# Patient Record
Sex: MALE | Race: BLACK OR AFRICAN AMERICAN | NOT HISPANIC OR LATINO | Employment: FULL TIME | URBAN - METROPOLITAN AREA
[De-identification: names, ages, dates, MRNs, and addresses within clinical notes are randomized per-mention and may not be internally consistent; named-entity substitution may affect disease eponyms.]

---

## 2024-05-17 ENCOUNTER — HOSPITAL ENCOUNTER (EMERGENCY)
Facility: HOSPITAL | Age: 59
Discharge: HOME | End: 2024-05-18
Attending: EMERGENCY MEDICINE
Payer: COMMERCIAL

## 2024-05-17 DIAGNOSIS — R79.89 ABNORMAL LFTS: ICD-10-CM

## 2024-05-17 DIAGNOSIS — L29.9 ITCHING: Primary | ICD-10-CM

## 2024-05-17 DIAGNOSIS — R73.9 HYPERGLYCEMIA: ICD-10-CM

## 2024-05-17 LAB — GLUCOSE BLDC GLUCOMTR-MCNC: 523 MG/DL (ref 70–130)

## 2024-05-17 PROCEDURE — 96374 THER/PROPH/DIAG INJ IV PUSH: CPT

## 2024-05-17 PROCEDURE — 25810000003 SODIUM CHLORIDE 0.9 % SOLUTION: Performed by: EMERGENCY MEDICINE

## 2024-05-17 PROCEDURE — 99285 EMERGENCY DEPT VISIT HI MDM: CPT

## 2024-05-17 PROCEDURE — 80053 COMPREHEN METABOLIC PANEL: CPT | Performed by: EMERGENCY MEDICINE

## 2024-05-17 PROCEDURE — 80143 DRUG ASSAY ACETAMINOPHEN: CPT | Performed by: EMERGENCY MEDICINE

## 2024-05-17 PROCEDURE — 85025 COMPLETE CBC W/AUTO DIFF WBC: CPT | Performed by: EMERGENCY MEDICINE

## 2024-05-17 PROCEDURE — 96375 TX/PRO/DX INJ NEW DRUG ADDON: CPT

## 2024-05-17 PROCEDURE — 82948 REAGENT STRIP/BLOOD GLUCOSE: CPT

## 2024-05-17 PROCEDURE — 25010000002 DIPHENHYDRAMINE PER 50 MG: Performed by: EMERGENCY MEDICINE

## 2024-05-17 RX ORDER — FAMOTIDINE 10 MG/ML
20 INJECTION, SOLUTION INTRAVENOUS ONCE
Status: COMPLETED | OUTPATIENT
Start: 2024-05-17 | End: 2024-05-17

## 2024-05-17 RX ORDER — DIPHENHYDRAMINE HYDROCHLORIDE 50 MG/ML
50 INJECTION INTRAMUSCULAR; INTRAVENOUS ONCE
Status: COMPLETED | OUTPATIENT
Start: 2024-05-17 | End: 2024-05-17

## 2024-05-17 RX ADMIN — SODIUM CHLORIDE 1000 ML: 9 INJECTION, SOLUTION INTRAVENOUS at 23:42

## 2024-05-17 RX ADMIN — DIPHENHYDRAMINE HYDROCHLORIDE 50 MG: 50 INJECTION INTRAMUSCULAR; INTRAVENOUS at 23:44

## 2024-05-17 RX ADMIN — FAMOTIDINE 20 MG: 10 INJECTION, SOLUTION INTRAVENOUS at 23:46

## 2024-05-18 ENCOUNTER — APPOINTMENT (OUTPATIENT)
Dept: CT IMAGING | Facility: HOSPITAL | Age: 59
End: 2024-05-18
Payer: COMMERCIAL

## 2024-05-18 VITALS
WEIGHT: 189 LBS | DIASTOLIC BLOOD PRESSURE: 87 MMHG | BODY MASS INDEX: 25.05 KG/M2 | HEIGHT: 73 IN | SYSTOLIC BLOOD PRESSURE: 166 MMHG | HEART RATE: 79 BPM | RESPIRATION RATE: 18 BRPM | TEMPERATURE: 98.6 F | OXYGEN SATURATION: 96 %

## 2024-05-18 LAB
ALBUMIN SERPL-MCNC: 3.6 G/DL (ref 3.5–5.2)
ALBUMIN/GLOB SERPL: 0.9 G/DL
ALP SERPL-CCNC: 508 U/L (ref 39–117)
ALT SERPL W P-5'-P-CCNC: 224 U/L (ref 1–41)
ANION GAP SERPL CALCULATED.3IONS-SCNC: 12 MMOL/L (ref 5–15)
APAP SERPL-MCNC: <5 MCG/ML (ref 0–30)
AST SERPL-CCNC: 138 U/L (ref 1–40)
BASOPHILS # BLD AUTO: 0.01 10*3/MM3 (ref 0–0.2)
BASOPHILS NFR BLD AUTO: 0.1 % (ref 0–1.5)
BILIRUB SERPL-MCNC: 2.2 MG/DL (ref 0–1.2)
BUN SERPL-MCNC: 24 MG/DL (ref 6–20)
BUN/CREAT SERPL: 15.1 (ref 7–25)
CALCIUM SPEC-SCNC: 9 MG/DL (ref 8.6–10.5)
CHLORIDE SERPL-SCNC: 94 MMOL/L (ref 98–107)
CO2 SERPL-SCNC: 22 MMOL/L (ref 22–29)
CREAT SERPL-MCNC: 1.59 MG/DL (ref 0.76–1.27)
DEPRECATED RDW RBC AUTO: 38.3 FL (ref 37–54)
EGFRCR SERPLBLD CKD-EPI 2021: 50 ML/MIN/1.73
EOSINOPHIL # BLD AUTO: 0.01 10*3/MM3 (ref 0–0.4)
EOSINOPHIL NFR BLD AUTO: 0.1 % (ref 0.3–6.2)
ERYTHROCYTE [DISTWIDTH] IN BLOOD BY AUTOMATED COUNT: 11.8 % (ref 12.3–15.4)
GLOBULIN UR ELPH-MCNC: 3.9 GM/DL
GLUCOSE BLDC GLUCOMTR-MCNC: 469 MG/DL (ref 70–130)
GLUCOSE SERPL-MCNC: 645 MG/DL (ref 65–99)
HAV IGM SERPL QL IA: NORMAL
HBV CORE IGM SERPL QL IA: NORMAL
HBV SURFACE AG SERPL QL IA: NORMAL
HCT VFR BLD AUTO: 39.1 % (ref 37.5–51)
HCV AB SER QL: NORMAL
HGB BLD-MCNC: 13.1 G/DL (ref 13–17.7)
IMM GRANULOCYTES # BLD AUTO: 0.04 10*3/MM3 (ref 0–0.05)
IMM GRANULOCYTES NFR BLD AUTO: 0.4 % (ref 0–0.5)
INR PPP: 0.98 (ref 0.89–1.12)
LYMPHOCYTES # BLD AUTO: 0.35 10*3/MM3 (ref 0.7–3.1)
LYMPHOCYTES NFR BLD AUTO: 3.9 % (ref 19.6–45.3)
MCH RBC QN AUTO: 29.9 PG (ref 26.6–33)
MCHC RBC AUTO-ENTMCNC: 33.5 G/DL (ref 31.5–35.7)
MCV RBC AUTO: 89.3 FL (ref 79–97)
MONOCYTES # BLD AUTO: 0.05 10*3/MM3 (ref 0.1–0.9)
MONOCYTES NFR BLD AUTO: 0.6 % (ref 5–12)
NEUTROPHILS NFR BLD AUTO: 8.57 10*3/MM3 (ref 1.7–7)
NEUTROPHILS NFR BLD AUTO: 94.9 % (ref 42.7–76)
NRBC BLD AUTO-RTO: 0 /100 WBC (ref 0–0.2)
PLATELET # BLD AUTO: 125 10*3/MM3 (ref 140–450)
PMV BLD AUTO: 14.1 FL (ref 6–12)
POTASSIUM SERPL-SCNC: 4.7 MMOL/L (ref 3.5–5.2)
PROT SERPL-MCNC: 7.5 G/DL (ref 6–8.5)
PROTHROMBIN TIME: 13.1 SECONDS (ref 12.2–14.5)
RBC # BLD AUTO: 4.38 10*6/MM3 (ref 4.14–5.8)
SODIUM SERPL-SCNC: 128 MMOL/L (ref 136–145)
WBC NRBC COR # BLD AUTO: 9.03 10*3/MM3 (ref 3.4–10.8)

## 2024-05-18 PROCEDURE — 63710000001 INSULIN REGULAR HUMAN PER 5 UNITS: Performed by: EMERGENCY MEDICINE

## 2024-05-18 PROCEDURE — 82948 REAGENT STRIP/BLOOD GLUCOSE: CPT

## 2024-05-18 PROCEDURE — 25810000003 SODIUM CHLORIDE 0.9 % SOLUTION: Performed by: EMERGENCY MEDICINE

## 2024-05-18 PROCEDURE — 25510000001 IOPAMIDOL 61 % SOLUTION: Performed by: EMERGENCY MEDICINE

## 2024-05-18 PROCEDURE — 85610 PROTHROMBIN TIME: CPT | Performed by: EMERGENCY MEDICINE

## 2024-05-18 PROCEDURE — 80074 ACUTE HEPATITIS PANEL: CPT | Performed by: EMERGENCY MEDICINE

## 2024-05-18 PROCEDURE — 74177 CT ABD & PELVIS W/CONTRAST: CPT

## 2024-05-18 RX ORDER — HYDROXYZINE PAMOATE 25 MG/1
25 CAPSULE ORAL 3 TIMES DAILY PRN
Qty: 20 CAPSULE | Refills: 0 | Status: SHIPPED | OUTPATIENT
Start: 2024-05-18

## 2024-05-18 RX ADMIN — INSULIN HUMAN 10 UNITS: 100 INJECTION, SOLUTION PARENTERAL at 00:20

## 2024-05-18 RX ADMIN — SODIUM CHLORIDE 1000 ML: 9 INJECTION, SOLUTION INTRAVENOUS at 01:15

## 2024-05-18 RX ADMIN — IOPAMIDOL 90 ML: 612 INJECTION, SOLUTION INTRAVENOUS at 00:34

## 2024-05-18 NOTE — ED PROVIDER NOTES
[facility]    EMERGENCY DEPARTMENT ENCOUNTER      Pt Name: Salvador Cortez  MRN: [id number]  YOB: 1965  Date of evaluation: 5/17/2024  Provider: Andi Cooney MD    CHIEF COMPLAINT       Chief Complaint   Patient presents with    Itching         HISTORY OF PRESENT ILLNESS   Salvador Cortez is a 58 y.o. male who presents to the emergency department with complaint of diffuse itching over the course of the past 2 days.  Patient has not noticed any skin rash.  Denies any new medications as well as any new recent exposures to soaps detergents,, or other identifiable allergens.  He has noticed no bugs in his home or on himself.  He has taken Benadryl at home with minimal relief.  Denies any other symptoms including any chest pain, fever, chills, abdominal pain, vomiting, diarrhea, or urinary symptoms.  He does have diabetes and notes that his blood glucose has been very high as well despite reported compliance with his medications.      Nursing notes were reviewed.    REVIEW OF SYSTEMS     ROS:  A chief complaint appropriate review of systems was completed and is negative except as noted in the HPI.      PAST MEDICAL HISTORY   No past medical history on file.      SURGICAL HISTORY     No past surgical history on file.      CURRENT MEDICATIONS     No current facility-administered medications for this encounter.    Current Outpatient Medications:     hydrOXYzine pamoate (Vistaril) 25 MG capsule, Take 1 capsule by mouth 3 (Three) Times a Day As Needed for Itching., Disp: 20 capsule, Rfl: 0    ALLERGIES     Patient has no known allergies.    FAMILY HISTORY     No family history on file.       SOCIAL HISTORY       Social History     Socioeconomic History    Marital status:          PHYSICAL EXAM    (up to 7 for level 4, 8 or more for level 5)     Vitals:    05/18/24 0112 05/18/24 0117 05/18/24 0122 05/18/24 0200   BP:       BP Location:       Patient Position:       Pulse: 90 89 93 79   Resp:    18    Temp:       TempSrc:       SpO2: 97% 98% 97% 96%   Weight:       Height:           General: Awake, alert, no acute distress.  HEENT: Conjunctivae normal.  Neck: Trachea midline.  Cardiac: Heart regular rate, rhythm, no murmurs, rubs, or gallops  Lungs: Lungs are clear to auscultation, there is no wheezing, rhonchi, or rales. There is no use of accessory muscles.  Chest wall: There is no tenderness to palpation over the chest wall or over ribs  Abdomen: Abdomen is soft, nontender, nondistended. There are no firm or pulsatile masses, no rebound rigidity or guarding.   Musculoskeletal: No deformity.  Neuro: Alert and oriented x 4.  Dermatology: Skin is warm and dry.  No skin rash noted.  Psych: Mentation is grossly normal, cognition is grossly normal. Affect is appropriate.        DIAGNOSTIC RESULTS     EKG: All EKGs are interpreted by the Emergency Department Physician who either signs or Co-signs this chart in the absence of a cardiologist.    No orders to display         RADIOLOGY:   [x] Radiologist's Report Reviewed:  CT Abdomen Pelvis With Contrast   Final Result   Impression:   No acute abdominal or pelvic abnormality            Electronically Signed: Chapin Parrish MD     5/18/2024 1:41 AM EDT     Workstation ID: [id number]          I ordered and independently reviewed the above noted radiographic studies.        LABS:    I have reviewed and interpreted all of the currently available lab results from this visit (if applicable):  Results for orders placed or performed during the hospital encounter of 05/17/24   Comprehensive Metabolic Panel    Specimen: Arm, Right; Blood   Result Value Ref Range    Glucose 645 (C) 65 - 99 mg/dL    BUN 24 (H) 6 - 20 mg/dL    Creatinine 1.59 (H) 0.76 - 1.27 mg/dL    Sodium 128 (L) 136 - 145 mmol/L    Potassium 4.7 3.5 - 5.2 mmol/L    Chloride 94 (L) 98 - 107 mmol/L    CO2 22.0 22.0 - 29.0 mmol/L    Calcium 9.0 8.6 - 10.5 mg/dL    Total Protein 7.5 6.0 - 8.5 g/dL    Albumin 3.6 3.5 -  5.2 g/dL    ALT (SGPT) 224 (H) 1 - 41 U/L    AST (SGOT) 138 (H) 1 - 40 U/L    Alkaline Phosphatase 508 (H) 39 - 117 U/L    Total Bilirubin 2.2 (H) 0.0 - 1.2 mg/dL    Globulin 3.9 gm/dL    A/G Ratio 0.9 g/dL    BUN/Creatinine Ratio 15.1 7.0 - 25.0    Anion Gap 12.0 5.0 - 15.0 mmol/L    eGFR 50.0 (L) >60.0 mL/min/1.73   CBC Auto Differential    Specimen: Arm, Right; Blood   Result Value Ref Range    WBC 9.03 3.40 - 10.80 10*3/mm3    RBC 4.38 4.14 - 5.80 10*6/mm3    Hemoglobin 13.1 13.0 - 17.7 g/dL    Hematocrit 39.1 37.5 - 51.0 %    MCV 89.3 79.0 - 97.0 fL    MCH 29.9 26.6 - 33.0 pg    MCHC 33.5 31.5 - 35.7 g/dL    RDW 11.8 (L) 12.3 - 15.4 %    RDW-SD 38.3 37.0 - 54.0 fl    MPV 14.1 (H) 6.0 - 12.0 fL    Platelets 125 (L) 140 - 450 10*3/mm3    Neutrophil % 94.9 (H) 42.7 - 76.0 %    Lymphocyte % 3.9 (L) 19.6 - 45.3 %    Monocyte % 0.6 (L) 5.0 - 12.0 %    Eosinophil % 0.1 (L) 0.3 - 6.2 %    Basophil % 0.1 0.0 - 1.5 %    Immature Grans % 0.4 0.0 - 0.5 %    Neutrophils, Absolute 8.57 (H) 1.70 - 7.00 10*3/mm3    Lymphocytes, Absolute 0.35 (L) 0.70 - 3.10 10*3/mm3    Monocytes, Absolute 0.05 (L) 0.10 - 0.90 10*3/mm3    Eosinophils, Absolute 0.01 0.00 - 0.40 10*3/mm3    Basophils, Absolute 0.01 0.00 - 0.20 10*3/mm3    Immature Grans, Absolute 0.04 0.00 - 0.05 10*3/mm3    nRBC 0.0 0.0 - 0.2 /100 WBC   Hepatitis Panel, Acute    Specimen: Arm, Right; Blood   Result Value Ref Range    Hepatitis B Surface Ag Non-Reactive Non-Reactive    Hep A IgM Non-Reactive Non-Reactive    Hep B C IgM Non-Reactive Non-Reactive    Hepatitis C Ab Non-Reactive Non-Reactive   Acetaminophen Level    Specimen: Arm, Right; Blood   Result Value Ref Range    Acetaminophen <5.0 0.0 - 30.0 mcg/mL   POC Glucose Once    Specimen: Blood   Result Value Ref Range    Glucose 523 (C) 70 - 130 mg/dL   POC Glucose Once    Specimen: Blood   Result Value Ref Range    Glucose 469 (C) 70 - 130 mg/dL        If labs were ordered, I independently reviewed the results and  considered them in treating the patient.      EMERGENCY DEPARTMENT COURSE and DIFFERENTIAL DIAGNOSIS/MDM:   Vitals:  AS OF 02:38 EDT    BP - 166/87  HR - 79  TEMP - 98.6 °F (37 °C) (Oral)  O2 SATS - 96%        Discussion below represents my analysis of pertinent findings related to patient's condition, differential diagnosis, treatment plan and final disposition.      Differential diagnosis:  The differential diagnosis associated with the patient's presentation includes: Allergic reaction, eczema, dry skin, liver disease, infestation      Independent interpretations (ECG/rhythm strip/X-ray/US/CT scan): I independently interpreted the patient's abdominal CT and cardiac monitor.  I see no evidence of biliary pathology and the patient is in sinus rhythm.      Additional sources:  Discussed/obtained information from independent historians:   [] Spouse:   [] Parent:   [] Friend:   [] EMS:   [x] Other: Additional history taken for the patient's significant other who accompanies the patient.  Reports the patient has been scratching constantly over the course of the last couple of days.  External (non-ED) record review:   [] Inpatient record:   [] Office record:   [] Outpatient record:   [] Prior Outpatient labs:   [] Prior Outpatient radiology:   [] Primary Care record:   [] Outside ED record:   [] Other:       Patient's care impacted by:   [x] Diabetes   [] Hypertension   [] Coronary Artery Disease   [] Cancer   [x] Other: Marijuana abuse    Care significantly affected by Social Determinants of Health (housing and economic circumstances, unemployment)    [] Yes     [x] No   If yes, Patient's care significantly limited by  Social Determinants of Health including:    [] Inadequate housing    [] Low income    [] Alcoholism and drug addiction in family    [] Problems related to primary support group    [] Unemployment    [] Problems related to employment    [] Other Social Determinants of Health:       Consideration of  admission/observation vs discharge: Consider admission, however after discussion with GI it was felt the patient could be followed up and worked up as an outpatient in the office on Monday.  Patient significantly hyperglycemic, however based on review of previous records it appears that patient's glucose is normally poorly controlled.  There is no evidence of associated DKA or HHS with his hyperglycemia today as well as no new renal injury or electrolyte derangement.  Patient was rehydrated and also given IV insulin with some improvement and felt that he was stable for discharge home with continued use of his outpatient medications.      ED Course:    ED Course as of 05/18/24 0238   Sat May 18, 2024   0159 Discussed case with GI Dr. Lewis.  I discussed patient history, presentation, workup.  Discussed labs in detail including abdominal imaging.  Patient does not need to be admitted at this time but he will see the patient in his office at 1030 on Monday morning. [NS]      ED Course User Index  [NS] Andi Cooney MD             I had a discussion with the patient/family regarding diagnosis, diagnostic results, treatment plan, and medications.  The patient/family indicated understanding of these instructions.  I spent adequate time at the bedside preceding discharge necessary to personally discuss the aftercare instructions, giving patient education, providing explanations of the results of our evaluations/findings, and my decision making to assure that the patient/family understand the plan of care.  Time was allotted to answer questions at that time and throughout the ED course.  Emphasis was placed on timely follow-up after discharge.  I also discussed the potential for the development of an acute emergent condition requiring further evaluation, admission, or even surgical intervention. I discussed that we found nothing during the visit today indicating the need for further workup, admission, or the presence  of an unstable medical condition.  I encouraged the patient to return to the emergency department immediately for ANY concerns, worsening, new complaints, or if symptoms persist and unable to seek follow-up in a timely fashion.  The patient/family expressed understanding and agreement with this plan.  The patient will follow-up with their PCP in 1-2 days for reevaluation.           PROCEDURES:  Procedures    CRITICAL CARE TIME        FINAL IMPRESSION      1. Itching    2. Abnormal LFTs    3. Hyperglycemia          DISPOSITION/PLAN     ED Disposition       ED Disposition   Discharge    Condition   Stable    Comment   --                 Comment: Please note this report has been produced using speech recognition software.      Andi Cooney MD  Attending Emergency Physician             Andi Cooney MD  05/18/24 4630

## 2024-05-18 NOTE — DISCHARGE INSTRUCTIONS
You have an appointment with gastroenterology at 10:30 AM on Monday morning.  Please attend this appointment as your liver enzymes are very abnormal and they need to do additional testing to make sure that something very serious is not going on.  Please return to the emergency department between now and then if your symptoms worsen in any way.

## 2024-05-20 ENCOUNTER — OFFICE VISIT (OUTPATIENT)
Dept: GASTROENTEROLOGY | Facility: CLINIC | Age: 59
End: 2024-05-20
Payer: COMMERCIAL

## 2024-05-20 ENCOUNTER — LAB (OUTPATIENT)
Dept: LAB | Facility: HOSPITAL | Age: 59
End: 2024-05-20
Payer: COMMERCIAL

## 2024-05-20 VITALS — BODY MASS INDEX: 25.84 KG/M2 | HEIGHT: 73 IN | WEIGHT: 195 LBS | TEMPERATURE: 97.7 F

## 2024-05-20 DIAGNOSIS — K74.60 CIRRHOSIS OF LIVER WITHOUT ASCITES, UNSPECIFIED HEPATIC CIRRHOSIS TYPE: ICD-10-CM

## 2024-05-20 DIAGNOSIS — R79.89 ELEVATED LIVER FUNCTION TESTS: ICD-10-CM

## 2024-05-20 DIAGNOSIS — Z00.00 HEALTHCARE MAINTENANCE: ICD-10-CM

## 2024-05-20 DIAGNOSIS — L29.8 CHOLESTATIC PRURITUS: Primary | ICD-10-CM

## 2024-05-20 DIAGNOSIS — E11.65 UNCONTROLLED DIABETES MELLITUS WITH HYPERGLYCEMIA, WITH LONG-TERM CURRENT USE OF INSULIN: ICD-10-CM

## 2024-05-20 DIAGNOSIS — Z79.4 UNCONTROLLED DIABETES MELLITUS WITH HYPERGLYCEMIA, WITH LONG-TERM CURRENT USE OF INSULIN: ICD-10-CM

## 2024-05-20 DIAGNOSIS — Z12.11 SCREEN FOR COLON CANCER: ICD-10-CM

## 2024-05-20 DIAGNOSIS — R79.89 ELEVATED SERUM CREATININE: ICD-10-CM

## 2024-05-20 PROBLEM — E11.21 DIABETIC NEPHROPATHY ASSOCIATED WITH TYPE 2 DIABETES MELLITUS: Chronic | Status: ACTIVE | Noted: 2023-11-13

## 2024-05-20 PROBLEM — K85.00 IDIOPATHIC ACUTE PANCREATITIS: Status: ACTIVE | Noted: 2023-11-13

## 2024-05-20 PROBLEM — I10 PRIMARY HYPERTENSION: Chronic | Status: ACTIVE | Noted: 2018-05-21

## 2024-05-20 PROBLEM — I87.2 VENOUS INSUFFICIENCY OF LEFT LOWER EXTREMITY: Status: ACTIVE | Noted: 2024-02-21

## 2024-05-20 PROBLEM — E11.9 DIABETES MELLITUS TREATED WITH INSULIN: Chronic | Status: ACTIVE | Noted: 2023-11-13

## 2024-05-20 PROBLEM — E11.9 DM (DIABETES MELLITUS): Chronic | Status: ACTIVE | Noted: 2018-05-21

## 2024-05-20 PROBLEM — Z96.642 STATUS POST TOTAL HIP REPLACEMENT, LEFT: Status: ACTIVE | Noted: 2021-12-13

## 2024-05-20 PROBLEM — E78.5 HYPERLIPIDEMIA: Chronic | Status: ACTIVE | Noted: 2018-05-21

## 2024-05-20 LAB
ALBUMIN SERPL-MCNC: 3.4 G/DL (ref 3.5–5.2)
ALP SERPL-CCNC: 597 U/L (ref 39–117)
ALPHA1 GLOB MFR UR ELPH: 189 MG/DL (ref 90–200)
ALT SERPL W P-5'-P-CCNC: 254 U/L (ref 1–41)
AST SERPL-CCNC: 177 U/L (ref 1–40)
BILIRUB CONJ SERPL-MCNC: 0.9 MG/DL (ref 0–0.3)
BILIRUB INDIRECT SERPL-MCNC: 0.4 MG/DL
BILIRUB SERPL-MCNC: 1.3 MG/DL (ref 0–1.2)
CERULOPLASMIN SERPL-MCNC: 33 MG/DL (ref 16–31)
CHOLEST SERPL-MCNC: 190 MG/DL (ref 0–200)
FERRITIN SERPL-MCNC: 323 NG/ML (ref 30–400)
HBV CORE IGM SERPL QL IA: NORMAL
HBV SURFACE AB SER RIA-ACNC: NORMAL
HBV SURFACE AG SERPL QL IA: NORMAL
HCV AB SER QL: NORMAL
HDLC SERPL-MCNC: 53 MG/DL (ref 40–60)
IRON 24H UR-MRATE: 36 MCG/DL (ref 59–158)
LDLC SERPL CALC-MCNC: 101 MG/DL (ref 0–100)
LDLC/HDLC SERPL: 1.79 {RATIO}
PROT SERPL-MCNC: 7.1 G/DL (ref 6–8.5)
TRIGL SERPL-MCNC: 211 MG/DL (ref 0–150)
VLDLC SERPL-MCNC: 36 MG/DL (ref 5–40)

## 2024-05-20 PROCEDURE — 86704 HEP B CORE ANTIBODY TOTAL: CPT

## 2024-05-20 PROCEDURE — 82390 ASSAY OF CERULOPLASMIN: CPT

## 2024-05-20 PROCEDURE — 80048 BASIC METABOLIC PNL TOTAL CA: CPT

## 2024-05-20 PROCEDURE — 82103 ALPHA-1-ANTITRYPSIN TOTAL: CPT

## 2024-05-20 PROCEDURE — 84443 ASSAY THYROID STIM HORMONE: CPT

## 2024-05-20 PROCEDURE — 86015 ACTIN ANTIBODY EACH: CPT

## 2024-05-20 PROCEDURE — 86706 HEP B SURFACE ANTIBODY: CPT

## 2024-05-20 PROCEDURE — 83540 ASSAY OF IRON: CPT

## 2024-05-20 PROCEDURE — 84439 ASSAY OF FREE THYROXINE: CPT

## 2024-05-20 PROCEDURE — 82784 ASSAY IGA/IGD/IGG/IGM EACH: CPT

## 2024-05-20 PROCEDURE — 36415 COLL VENOUS BLD VENIPUNCTURE: CPT

## 2024-05-20 PROCEDURE — 82728 ASSAY OF FERRITIN: CPT

## 2024-05-20 PROCEDURE — 80074 ACUTE HEPATITIS PANEL: CPT

## 2024-05-20 PROCEDURE — 80061 LIPID PANEL: CPT

## 2024-05-20 PROCEDURE — 80076 HEPATIC FUNCTION PANEL: CPT

## 2024-05-20 PROCEDURE — 86038 ANTINUCLEAR ANTIBODIES: CPT

## 2024-05-20 PROCEDURE — 86381 MITOCHONDRIAL ANTIBODY EACH: CPT

## 2024-05-20 RX ORDER — PEN NEEDLE, DIABETIC 32 GX 1/4"
NEEDLE, DISPOSABLE MISCELLANEOUS
COMMUNITY
Start: 2024-03-13

## 2024-05-20 RX ORDER — INSULIN GLARGINE 100 [IU]/ML
40 INJECTION, SOLUTION SUBCUTANEOUS DAILY
COMMUNITY
Start: 2024-03-13 | End: 2025-03-13

## 2024-05-20 RX ORDER — CHOLESTYRAMINE 4 G/9G
4 POWDER, FOR SUSPENSION ORAL 2 TIMES DAILY WITH MEALS
Qty: 378 G | Refills: 3 | Status: SHIPPED | OUTPATIENT
Start: 2024-05-20

## 2024-05-20 RX ORDER — IBUPROFEN 600 MG/1
TABLET ORAL
COMMUNITY
Start: 2024-05-08

## 2024-05-20 NOTE — PROGRESS NOTES
Office Note     Name: Salvador Cortez    : 1965     MRN: [id number]     Chief Complaint  Itching    Subjective     History of Present Illness:  Salvador Cortez is a 58 y.o. male with PMH significant for insulin-dependent DM, tobacco use, HTN, HLD, and acute pancreatitis in 2023, who presents today as hospital follow-up for diffuse itching, where he was also noted to have elevated liver enzymes, in a cholestatic pattern, including alk phos of 508 , and .  Previously normal in 2024. He also endorses excessive daytime sleepiness for the past 2 weeks, and appeared to be dozing off a couple of times during the exam.  He denies any new medications or recent exposure to new soaps, detergents, etc. He has multiple generalized skin lesions/vesicles, secondary to pruritus and scratching. Denies seeing any bugs in his home or on himself.  He was discharged from [facility] ED with hydroxyzine 3 times daily as needed, without symptom relief.  He does state that the drowsiness was present prior to initiating antihistamines. He has not had any fever, chills, abdominal pain, odynophagia, dysphagia vomiting, or diarrhea.  He endorses tobacco and marijuana use. His glucose has been extremely elevated, as high as 500-600, despite reported compliance with prescribed insulin regimen. No evidence of HHS or DKA on the ED.               Past Medical History:   Past Medical History:   Diagnosis Date    Tobacco use     Cessation encouraged       Past Surgical History: History reviewed. No pertinent surgical history.    Immunizations:   There is no immunization history on file for this patient.     Medications:     Current Outpatient Medications:     BD Pen Needle Micro U/F 32G X 6 MM misc, , Disp: , Rfl:      MG tablet, , Disp: , Rfl:     Lantus SoloStar 100 UNIT/ML injection pen, Inject 40 Units under the skin into the appropriate area as directed Daily., Disp: , Rfl:     cholestyramine  "(QUESTRAN) 4 GM/DOSE powder, Take 1 packet by mouth 2 (Two) Times a Day With Meals. mixed with a liquid, Disp: 378 g, Rfl: 3    hydrOXYzine pamoate (Vistaril) 25 MG capsule, Take 1 capsule by mouth 3 (Three) Times a Day As Needed for Itching., Disp: 20 capsule, Rfl: 0    Allergies:   No Known Allergies    Family History: History reviewed. No pertinent family history.    Social History:   Social History     Socioeconomic History    Marital status:    , working for the city. He is off work on Wednesdays and those are the best days for appointments.       Objective     Vital Signs  Temp 97.7 °F (36.5 °C)   Ht 185.4 cm (73\")   Wt 88.5 kg (195 lb)   BMI 25.73 kg/m²   Estimated body mass index is 25.73 kg/m² as calculated from the following:    Height as of this encounter: 185.4 cm (73\").    Weight as of this encounter: 88.5 kg (195 lb).    BMI is within normal parameters. No other follow-up for BMI required.      Physical Exam  Vitals reviewed.   Eyes:      General: No scleral icterus.  Pulmonary:      Effort: Pulmonary effort is normal.   Abdominal:      Palpations: There is no hepatomegaly or splenomegaly.      Tenderness: There is no abdominal tenderness. Negative signs include Ballesteros's sign.   Skin:     General: Skin is warm and dry.      Capillary Refill: Capillary refill takes less than 2 seconds.      Findings: Rash present. Rash is vesicular.   Neurological:      Mental Status: He is lethargic.   Psychiatric:         Behavior: Behavior is cooperative.          Assessment and Plan     Assessment & Plan  Healthcare maintenance    Hepatitis B vaccine when clinically improved, as he is not immune, per labs.   He believes that he had these vaccines when he was incarcerated, but is unsure when and does not have documentation.  Recommend patient get established with an endocrinologist to help him manage his diabetes.  His blood sugars have been poorly controlled, despite reported adherence to prescribed " regimen. Will place urgent referral.       Elevated liver function tests  (5/17/2024): , , alk phos 508, total bilirubin 2.2.  PT 13.1/INR 0.98.  Platelets 125, appears to be chronically low.  Suspect possible PBC, given history.   Previous hepatitis panel negative.  CT abdomen pelvis (5/18/2024): No acute abnormalities.  Liver normal in size without lesions.  CT Abdomen Pelvis Without Contrast (11/12/2023 16:33)   Cholestatic pruritus  Presumed cholestatic pruritus, given background and symptoms.  Antihistamines have not improved his symptoms.  Cholestyramine 1 packet by mouth twice daily with meals, mixed with liquid.  They are going to try this and let me know if it works for them.      Elevated serum creatinine  Recommend ongoing follow-up with PCP      Screen for colon cancer  No documentation of prior colorectal cancer screening  Recommend colonoscopy for colorectal cancer screening in the near future, once current condition improves.    Uncontrolled diabetes mellitus with hyperglycemia, with long-term current use of insulin    Referral to endocrinology     Cirrhosis of liver without ascites, unspecified hepatic cirrhosis type  Suspected cirrhosis, given low platelet count. Further recommendations to follow workup.         Orders Placed This Encounter   Procedures    Alpha - 1 - Antitrypsin    KENNEDI    Anti-Smooth Muscle Antibody Titer    Ceruloplasmin    Ferritin    Hepatitis A Antibody, Total    Hepatitis B Core Antibody, IgM    Mitochondrial Antibodies, M2    Iron    Hepatitis C Antibody    Hepatitis B Surface Antigen    Hepatitis B Surface Antibody    Hepatitis B Core Antibody, Total    Hepatic Function Panel    Lipid Panel    Basic Metabolic Panel    Ambulatory Referral For Screening Colonoscopy    Ambulatory Referral to Endocrinology     New Medications Ordered This Visit   Medications    cholestyramine (QUESTRAN) 4 GM/DOSE powder     Sig: Take 1 packet by mouth 2 (Two) Times a Day With  Meals. mixed with a liquid     Dispense:  378 g     Refill:  3          Follow Up  No follow-ups on file.    MIHIR Llamas  [facility] [id number]  [facility]  [address] [id number]

## 2024-05-20 NOTE — PATIENT INSTRUCTIONS
Get labs drawn for elevated liver enzymes.   Establish primary care with a provider. Will need referrals to endocrinology, nephrology, and possibly dermatology.   Cholestyramine powder, twice daily with meals for itching.   Schedule colonoscopy in the near future for colorectal cancer screening.     Josefina Yeung DO  [address] [place] [address] · [address]  [phone]

## 2024-05-21 ENCOUNTER — DOCUMENTATION (OUTPATIENT)
Dept: GASTROENTEROLOGY | Facility: CLINIC | Age: 59
End: 2024-05-21
Payer: COMMERCIAL

## 2024-05-21 LAB
ANA SER QL: NEGATIVE
ANION GAP SERPL CALCULATED.3IONS-SCNC: 14.2 MMOL/L (ref 5–15)
BUN SERPL-MCNC: 27 MG/DL (ref 6–20)
BUN/CREAT SERPL: 18.9 (ref 7–25)
CALCIUM SPEC-SCNC: 9.2 MG/DL (ref 8.6–10.5)
CHLORIDE SERPL-SCNC: 100 MMOL/L (ref 98–107)
CO2 SERPL-SCNC: 19.8 MMOL/L (ref 22–29)
CREAT SERPL-MCNC: 1.43 MG/DL (ref 0.76–1.27)
EGFRCR SERPLBLD CKD-EPI 2021: 56.8 ML/MIN/1.73
GLUCOSE SERPL-MCNC: 400 MG/DL (ref 65–99)
HAV AB SER QL IA: NEGATIVE
HBV CORE AB SERPL QL IA: NEGATIVE
IGA1 MFR SER: 217 MG/DL (ref 70–400)
IGG1 SER-MCNC: 1622 MG/DL (ref 700–1600)
IGM SERPL-MCNC: 118 MG/DL (ref 40–230)
MITOCHONDRIA M2 IGG SER-ACNC: 62.8 UNITS (ref 0–20)
POTASSIUM SERPL-SCNC: 4.5 MMOL/L (ref 3.5–5.2)
SMA IGG SER-ACNC: 17 UNITS (ref 0–19)
SODIUM SERPL-SCNC: 134 MMOL/L (ref 136–145)
T4 FREE SERPL-MCNC: 1 NG/DL (ref 0.93–1.7)
TSH SERPL DL<=0.05 MIU/L-ACNC: 0.96 UIU/ML (ref 0.27–4.2)

## 2024-05-21 RX ORDER — CLOBETASOL PROPIONATE 0.5 MG/G
1 OINTMENT TOPICAL NIGHTLY
Qty: 60 G | Refills: 0 | Status: SHIPPED | OUTPATIENT
Start: 2024-05-21

## 2024-05-21 NOTE — PROGRESS NOTES
Elevated triglycerides and LDL noted on labs. Given diabetes, would like to maintain LDL < 70. Will order atorvastatin 40 mg to reduce risk of ASCVD and pancreatitis. Also encourage weight loss of roughly 5% of current body weight, and 150 min/week of exercise, if possible. Reduce intake of simple carbohydrates, high-fructose foods/drinks. < 5% of total calories should come from added sugar. Total fat intake should be < 20-25% of total calories. My fitness pal can be helpful in tracking intake and calories.

## 2024-05-22 ENCOUNTER — TELEPHONE (OUTPATIENT)
Dept: INTERNAL MEDICINE | Facility: CLINIC | Age: 59
End: 2024-05-22

## 2024-05-22 DIAGNOSIS — R79.89 ELEVATED LIVER FUNCTION TESTS: Primary | ICD-10-CM

## 2024-05-22 DIAGNOSIS — K74.3 HEPATIC CIRRHOSIS DUE TO PRIMARY BILIARY CHOLANGITIS: Primary | ICD-10-CM

## 2024-05-22 RX ORDER — URSODIOL 300 MG/1
300 CAPSULE ORAL 3 TIMES DAILY
Qty: 180 CAPSULE | Refills: 3 | Status: SHIPPED | OUTPATIENT
Start: 2024-05-22

## 2024-05-22 NOTE — TELEPHONE ENCOUNTER
Caller: MARIA VICTORIA MOLINA    Relationship: Emergency Contact    Best call back number: [phone]       What test was performed: LIVER TEST    When was the test performed: 5/18/24      Additional notes: PATIENTS SPOUSE STATES HE IS STILL ITCHING AND THE MEDICATION IS NOT WORKING

## 2024-05-23 ENCOUNTER — TELEPHONE (OUTPATIENT)
Dept: GASTROENTEROLOGY | Facility: CLINIC | Age: 59
End: 2024-05-23
Payer: COMMERCIAL

## 2024-05-23 DIAGNOSIS — R79.89 ELEVATED LIVER FUNCTION TESTS: Primary | ICD-10-CM

## 2024-05-28 ENCOUNTER — TELEPHONE (OUTPATIENT)
Dept: GASTROENTEROLOGY | Facility: CLINIC | Age: 59
End: 2024-05-28

## 2024-05-28 NOTE — TELEPHONE ENCOUNTER
Hub staff attempted to follow warm transfer process and was unsuccessful     Caller: MARIA VICTORIA MOLINA    Relationship to patient: Emergency Contact    Best call back number:  [phone] OR [phone]    Patient is needing: CALLING REGARDING LAB RESULTS

## 2024-05-30 ENCOUNTER — OFFICE VISIT (OUTPATIENT)
Dept: ENDOCRINOLOGY | Facility: CLINIC | Age: 59
End: 2024-05-30
Payer: COMMERCIAL

## 2024-05-30 ENCOUNTER — DOCUMENTATION (OUTPATIENT)
Dept: ENDOCRINOLOGY | Facility: CLINIC | Age: 59
End: 2024-05-30

## 2024-05-30 VITALS
HEART RATE: 82 BPM | DIASTOLIC BLOOD PRESSURE: 72 MMHG | SYSTOLIC BLOOD PRESSURE: 122 MMHG | OXYGEN SATURATION: 98 % | HEIGHT: 73 IN | WEIGHT: 190 LBS | BODY MASS INDEX: 25.18 KG/M2

## 2024-05-30 DIAGNOSIS — I10 PRIMARY HYPERTENSION: Chronic | ICD-10-CM

## 2024-05-30 DIAGNOSIS — E78.2 MIXED HYPERLIPIDEMIA: Chronic | ICD-10-CM

## 2024-05-30 DIAGNOSIS — Z79.4 DIABETES MELLITUS TREATED WITH INSULIN: Primary | Chronic | ICD-10-CM

## 2024-05-30 DIAGNOSIS — E11.9 DIABETES MELLITUS TREATED WITH INSULIN: Primary | Chronic | ICD-10-CM

## 2024-05-30 LAB
EXPIRATION DATE: ABNORMAL
EXPIRATION DATE: ABNORMAL
GLUCOSE BLDC GLUCOMTR-MCNC: 194 MG/DL (ref 70–130)
HBA1C MFR BLD: 9.7 % (ref 4.5–5.7)
Lab: ABNORMAL
Lab: ABNORMAL

## 2024-05-30 RX ORDER — BLOOD-GLUCOSE SENSOR
1 EACH MISCELLANEOUS
Qty: 6 EACH | Refills: 3 | Status: SHIPPED | OUTPATIENT
Start: 2024-05-30

## 2024-05-30 NOTE — ASSESSMENT & PLAN NOTE
Recent LDL and trigs just above goal.  We discussed treatment options.  I don't want to start meds due to elevated LFTs.  Hopefully these should improve with better DM control.

## 2024-05-30 NOTE — PROGRESS NOTES
Pt seen for DM education, reviewed BG goals, basic nutrition, and reviewed CGM usage. Pt set up with [facility] - pt is sharing. Encouraged pt and family member to call with questions/concerns.

## 2024-05-30 NOTE — PROGRESS NOTES
"     Office Note      Date: 2024  Patient Name: Salvador Cortez  MRN: [id number]  : 1965    Chief Complaint   Patient presents with    Diabetes     Non-controlled DM with glucose levels 500-600. On insulin.       History of Present Illness:   Salvador Cortez is a 58 y.o. male who presents for Diabetes type 2. Diagnosed in: 2019. Treated in past with insulin. Subsequently changed to metformin.  Current treatments: basal bolus insulin. Number of insulin shots per day: 3. Checks blood sugar none times a day. Has low blood sugar: no. Aspirin use: No -   . Statin use: No - on cholestyramine . ACE-I/ARB use: No -   .  Last eye exam: 2024.    He hasn't had any formal DM education.    He had episode of pancreatitis in 2023.    He has had issues with itching.  He has had some peeling of the skin on his feet and hands.      Subjective      Diabetic Complications:  Eyes: No  Kidneys: No  Feet: No  Heart: No    Diet and Exercise:  Meals per day: 3  Minutes of exercise per week: 0 mins.    Review of Systems:   Review of Systems   Constitutional: Negative.    HENT: Negative.     Eyes: Negative.    Respiratory: Negative.     Cardiovascular:  Positive for leg swelling.   Gastrointestinal: Negative.    Endocrine: Negative.    Genitourinary: Negative.    Musculoskeletal:  Positive for gait problem and myalgias.   Skin:  Positive for color change and rash.   Allergic/Immunologic: Negative.    Hematological: Negative.    Psychiatric/Behavioral: Negative.         The following portions of the patient's history were reviewed and updated as appropriate: allergies, current medications, past family history, past medical history, past social history, past surgical history, and problem list.    Objective     Visit Vitals  /72 (BP Location: Right arm, Patient Position: Sitting, Cuff Size: Adult)   Pulse 82   Ht 185.4 cm (73\")   Wt 86.2 kg (190 lb)   SpO2 98%   BMI 25.07 kg/m²       Physical Exam:  Physical " Exam  Constitutional:       Appearance: Normal appearance.   HENT:      Head: Normocephalic and atraumatic.   Eyes:      Extraocular Movements: Extraocular movements intact.      Conjunctiva/sclera: Conjunctivae normal.   Neck:      Thyroid: No thyroid mass, thyromegaly or thyroid tenderness.   Cardiovascular:      Rate and Rhythm: Normal rate and regular rhythm.      Pulses: Normal pulses.      Heart sounds: Normal heart sounds.   Pulmonary:      Effort: Pulmonary effort is normal.      Breath sounds: Normal breath sounds.   Abdominal:      General: Bowel sounds are normal.      Palpations: Abdomen is soft.   Musculoskeletal:         General: Normal range of motion.      Cervical back: Normal range of motion and neck supple.   Feet:      Right foot:      Skin integrity: Dry skin present.      Left foot:      Skin integrity: Dry skin present.   Lymphadenopathy:      Cervical: No cervical adenopathy.   Skin:     General: Skin is warm and dry.   Neurological:      General: No focal deficit present.      Mental Status: He is alert.   Psychiatric:         Mood and Affect: Mood normal.         Behavior: Behavior normal.         Thought Content: Thought content normal.         Judgment: Judgment normal.         Labs:    HbA1c  Hemoglobin A1C   Date Value Ref Range Status   05/30/2024 9.7 (A) 4.5 - 5.7 % Final   .    CMP  Lab Results   Component Value Date    GLUCOSE 400 (H) 05/20/2024    BUN 27 (H) 05/20/2024    CREATININE 1.43 (H) 05/20/2024    BCR 18.9 05/20/2024    K 4.5 05/20/2024    CO2 19.8 (L) 05/20/2024    CALCIUM 9.2 05/20/2024     (H) 05/20/2024     (H) 05/20/2024        Lipid Panel  Lab Results   Component Value Date    HDL 53 05/20/2024     (H) 05/20/2024    TRIG 211 (H) 05/20/2024        TSH  Lab Results   Component Value Date    TSH 0.956 05/20/2024    FREET4 1.00 05/20/2024        Hemoglobin A1C  Lab Results   Component Value Date    HGBA1C 9.7 (A) 05/30/2024     "    Microalbumin/Creatinine  No results found for: \"MALBCRERATI\"        Assessment / Plan      Assessment & Plan:  Diagnoses and all orders for this visit:    1. Diabetes mellitus treated with insulin (Primary)  Assessment & Plan:  Diabetes is  uncontrolled .  A1c above goal.    We discussed treatment options.  I don't want to use GLP-1 RA due to recent pancreatitis.  Trial of SGLT-2 inhibitor.  Potential s/e discussed.  Start CGM also.    Diabetes will be reassessed in 3 months.    Orders:  -     POC Glucose, Blood  -     POC Glycosylated Hemoglobin (Hb A1C)    2. Primary hypertension  Assessment & Plan:  BP improving with treatment.  Continue current meds.  Monitor BP at home.      3. Mixed hyperlipidemia  Assessment & Plan:  Recent LDL and trigs just above goal.  We discussed treatment options.  I don't want to start meds due to elevated LFTs.  Hopefully these should improve with better DM control.      Other orders  -     Continuous Glucose Sensor (FreeStyle Wilbert 3 Sensor) misc; Use 1 each Every 14 (Fourteen) Days.  Dispense: 6 each; Refill: 3  -     empagliflozin (JARDIANCE) 25 MG tablet tablet; Take 1 tablet by mouth Daily.  Dispense: 90 tablet; Refill: 3       Current Outpatient Medications   Medication Instructions    BD Pen Needle Micro U/F 32G X 6 MM misc     cholestyramine (QUESTRAN) 4 g, Oral, 2 Times Daily With Meals, mixed with a liquid    clobetasol (TEMOVATE) 0.05 % ointment 1 Application, Topical, Nightly    Continuous Glucose Sensor (FreeStyle Wilbert 3 Sensor) misc 1 each, Does not apply, Every 14 Days    empagliflozin (JARDIANCE) 25 mg, Oral, Daily    hydrOXYzine pamoate (VISTARIL) 25 mg, Oral, 3 Times Daily PRN     MG tablet     insulin aspart (NOVOLOG FLEXPEN) 20 Units, Subcutaneous, 3 Times Daily With Meals    Lantus SoloStar 40 Units, Subcutaneous, Daily    ursodiol (ACTIGALL) 300 mg, Oral, 3 Times Daily      Return in about 3 months (around 8/30/2024) for Recheck with A1c, CMP, lipid, " TSH, microalbumin - ok to schedule with PA.    Electronically signed by: Gianni Cruz MD  05/30/2024

## 2024-05-30 NOTE — ASSESSMENT & PLAN NOTE
Diabetes is  uncontrolled .  A1c above goal.    We discussed treatment options.  I don't want to use GLP-1 RA due to recent pancreatitis.  Trial of SGLT-2 inhibitor.  Potential s/e discussed.  Start CGM also.    Diabetes will be reassessed in 3 months.

## 2024-05-31 ENCOUNTER — TELEPHONE (OUTPATIENT)
Dept: ENDOCRINOLOGY | Facility: CLINIC | Age: 59
End: 2024-05-31

## 2024-05-31 ENCOUNTER — HOSPITAL ENCOUNTER (EMERGENCY)
Facility: HOSPITAL | Age: 59
Discharge: HOME | End: 2024-05-31
Attending: EMERGENCY MEDICINE
Payer: COMMERCIAL

## 2024-05-31 ENCOUNTER — PRIOR AUTHORIZATION (OUTPATIENT)
Dept: ENDOCRINOLOGY | Facility: CLINIC | Age: 59
End: 2024-05-31
Payer: COMMERCIAL

## 2024-05-31 ENCOUNTER — APPOINTMENT (OUTPATIENT)
Dept: GENERAL RADIOLOGY | Facility: HOSPITAL | Age: 59
End: 2024-05-31
Payer: COMMERCIAL

## 2024-05-31 ENCOUNTER — APPOINTMENT (OUTPATIENT)
Dept: CARDIOLOGY | Facility: HOSPITAL | Age: 59
End: 2024-05-31
Payer: COMMERCIAL

## 2024-05-31 ENCOUNTER — TELEPHONE (OUTPATIENT)
Dept: GASTROENTEROLOGY | Facility: CLINIC | Age: 59
End: 2024-05-31
Payer: COMMERCIAL

## 2024-05-31 VITALS
SYSTOLIC BLOOD PRESSURE: 117 MMHG | OXYGEN SATURATION: 100 % | DIASTOLIC BLOOD PRESSURE: 65 MMHG | HEIGHT: 73 IN | RESPIRATION RATE: 18 BRPM | WEIGHT: 193 LBS | HEART RATE: 82 BPM | TEMPERATURE: 98.9 F | BODY MASS INDEX: 25.58 KG/M2

## 2024-05-31 DIAGNOSIS — R21 RASH: ICD-10-CM

## 2024-05-31 DIAGNOSIS — R60.0 PEDAL EDEMA: Primary | ICD-10-CM

## 2024-05-31 DIAGNOSIS — R79.89 ELEVATED LIVER FUNCTION TESTS: ICD-10-CM

## 2024-05-31 DIAGNOSIS — I07.1 TRICUSPID VALVE INSUFFICIENCY, UNSPECIFIED ETIOLOGY: ICD-10-CM

## 2024-05-31 LAB
ALBUMIN SERPL-MCNC: 3.4 G/DL (ref 3.5–5.2)
ALBUMIN/GLOB SERPL: 0.9 G/DL
ALP SERPL-CCNC: 283 U/L (ref 39–117)
ALT SERPL W P-5'-P-CCNC: 32 U/L (ref 1–41)
ANION GAP SERPL CALCULATED.3IONS-SCNC: 6 MMOL/L (ref 5–15)
ANION GAP SERPL CALCULATED.3IONS-SCNC: 7 MMOL/L (ref 5–15)
AST SERPL-CCNC: 17 U/L (ref 1–40)
BASOPHILS # BLD AUTO: 0.01 10*3/MM3 (ref 0–0.2)
BASOPHILS NFR BLD AUTO: 0.2 % (ref 0–1.5)
BILIRUB SERPL-MCNC: 0.8 MG/DL (ref 0–1.2)
BUN SERPL-MCNC: 13 MG/DL (ref 6–20)
BUN SERPL-MCNC: 13 MG/DL (ref 6–20)
BUN/CREAT SERPL: 10.5 (ref 7–25)
BUN/CREAT SERPL: 9.8 (ref 7–25)
CALCIUM SPEC-SCNC: 8.9 MG/DL (ref 8.6–10.5)
CALCIUM SPEC-SCNC: 8.9 MG/DL (ref 8.6–10.5)
CHLORIDE SERPL-SCNC: 103 MMOL/L (ref 98–107)
CHLORIDE SERPL-SCNC: 103 MMOL/L (ref 98–107)
CO2 SERPL-SCNC: 28 MMOL/L (ref 22–29)
CO2 SERPL-SCNC: 29 MMOL/L (ref 22–29)
CREAT SERPL-MCNC: 1.24 MG/DL (ref 0.76–1.27)
CREAT SERPL-MCNC: 1.33 MG/DL (ref 0.76–1.27)
CRP SERPL-MCNC: 3.31 MG/DL (ref 0–0.5)
D-LACTATE SERPL-SCNC: 0.8 MMOL/L (ref 0.5–2)
DEPRECATED RDW RBC AUTO: 39.8 FL (ref 37–54)
EGFRCR SERPLBLD CKD-EPI 2021: 62 ML/MIN/1.73
EGFRCR SERPLBLD CKD-EPI 2021: 67.4 ML/MIN/1.73
EOSINOPHIL # BLD AUTO: 0.12 10*3/MM3 (ref 0–0.4)
EOSINOPHIL NFR BLD AUTO: 2.6 % (ref 0.3–6.2)
ERYTHROCYTE [DISTWIDTH] IN BLOOD BY AUTOMATED COUNT: 11.9 % (ref 12.3–15.4)
ERYTHROCYTE [SEDIMENTATION RATE] IN BLOOD: 86 MM/HR (ref 0–20)
GLOBULIN UR ELPH-MCNC: 3.8 GM/DL
GLUCOSE SERPL-MCNC: 231 MG/DL (ref 65–99)
GLUCOSE SERPL-MCNC: 233 MG/DL (ref 65–99)
HCT VFR BLD AUTO: 32.8 % (ref 37.5–51)
HGB BLD-MCNC: 10.7 G/DL (ref 13–17.7)
IMM GRANULOCYTES # BLD AUTO: 0.01 10*3/MM3 (ref 0–0.05)
IMM GRANULOCYTES NFR BLD AUTO: 0.2 % (ref 0–0.5)
INR PPP: 1.03 (ref 0.89–1.12)
LITHIUM SERPL-SCNC: <0.1 MMOL/L (ref 0.6–1.2)
LYMPHOCYTES # BLD AUTO: 0.9 10*3/MM3 (ref 0.7–3.1)
LYMPHOCYTES NFR BLD AUTO: 19.5 % (ref 19.6–45.3)
MAGNESIUM SERPL-MCNC: 1.6 MG/DL (ref 1.6–2.6)
MCH RBC QN AUTO: 29.7 PG (ref 26.6–33)
MCHC RBC AUTO-ENTMCNC: 32.6 G/DL (ref 31.5–35.7)
MCV RBC AUTO: 91.1 FL (ref 79–97)
MONOCYTES # BLD AUTO: 0.3 10*3/MM3 (ref 0.1–0.9)
MONOCYTES NFR BLD AUTO: 6.5 % (ref 5–12)
NEUTROPHILS NFR BLD AUTO: 3.27 10*3/MM3 (ref 1.7–7)
NEUTROPHILS NFR BLD AUTO: 71 % (ref 42.7–76)
NRBC BLD AUTO-RTO: 0 /100 WBC (ref 0–0.2)
NT-PROBNP SERPL-MCNC: 294.5 PG/ML (ref 0–900)
PLATELET # BLD AUTO: 132 10*3/MM3 (ref 140–450)
PMV BLD AUTO: 13.4 FL (ref 6–12)
POTASSIUM SERPL-SCNC: 3.8 MMOL/L (ref 3.5–5.2)
POTASSIUM SERPL-SCNC: 3.9 MMOL/L (ref 3.5–5.2)
PROT SERPL-MCNC: 7.2 G/DL (ref 6–8.5)
PROTHROMBIN TIME: 13.6 SECONDS (ref 12.2–14.5)
QT INTERVAL: 388 MS
QTC INTERVAL: 393 MS
RBC # BLD AUTO: 3.6 10*6/MM3 (ref 4.14–5.8)
SODIUM SERPL-SCNC: 138 MMOL/L (ref 136–145)
SODIUM SERPL-SCNC: 138 MMOL/L (ref 136–145)
WBC NRBC COR # BLD AUTO: 4.61 10*3/MM3 (ref 3.4–10.8)

## 2024-05-31 PROCEDURE — 99284 EMERGENCY DEPT VISIT MOD MDM: CPT

## 2024-05-31 PROCEDURE — 86592 SYPHILIS TEST NON-TREP QUAL: CPT | Performed by: EMERGENCY MEDICINE

## 2024-05-31 PROCEDURE — 83605 ASSAY OF LACTIC ACID: CPT | Performed by: EMERGENCY MEDICINE

## 2024-05-31 PROCEDURE — 86140 C-REACTIVE PROTEIN: CPT | Performed by: EMERGENCY MEDICINE

## 2024-05-31 PROCEDURE — 93306 TTE W/DOPPLER COMPLETE: CPT

## 2024-05-31 PROCEDURE — 93306 TTE W/DOPPLER COMPLETE: CPT | Performed by: INTERNAL MEDICINE

## 2024-05-31 PROCEDURE — 87040 BLOOD CULTURE FOR BACTERIA: CPT | Performed by: EMERGENCY MEDICINE

## 2024-05-31 PROCEDURE — 84466 ASSAY OF TRANSFERRIN: CPT

## 2024-05-31 PROCEDURE — 80053 COMPREHEN METABOLIC PANEL: CPT | Performed by: EMERGENCY MEDICINE

## 2024-05-31 PROCEDURE — 85025 COMPLETE CBC W/AUTO DIFF WBC: CPT | Performed by: EMERGENCY MEDICINE

## 2024-05-31 PROCEDURE — 86376 MICROSOMAL ANTIBODY EACH: CPT | Performed by: INTERNAL MEDICINE

## 2024-05-31 PROCEDURE — 80178 ASSAY OF LITHIUM: CPT

## 2024-05-31 PROCEDURE — 36415 COLL VENOUS BLD VENIPUNCTURE: CPT

## 2024-05-31 PROCEDURE — 85610 PROTHROMBIN TIME: CPT

## 2024-05-31 PROCEDURE — 83880 ASSAY OF NATRIURETIC PEPTIDE: CPT | Performed by: EMERGENCY MEDICINE

## 2024-05-31 PROCEDURE — 86593 SYPHILIS TEST NON-TREP QUANT: CPT | Performed by: EMERGENCY MEDICINE

## 2024-05-31 PROCEDURE — 86780 TREPONEMA PALLIDUM: CPT | Performed by: EMERGENCY MEDICINE

## 2024-05-31 PROCEDURE — 83516 IMMUNOASSAY NONANTIBODY: CPT | Performed by: INTERNAL MEDICINE

## 2024-05-31 PROCEDURE — 83540 ASSAY OF IRON: CPT

## 2024-05-31 PROCEDURE — 93005 ELECTROCARDIOGRAM TRACING: CPT | Performed by: EMERGENCY MEDICINE

## 2024-05-31 PROCEDURE — 85652 RBC SED RATE AUTOMATED: CPT | Performed by: EMERGENCY MEDICINE

## 2024-05-31 PROCEDURE — 82787 IGG 1 2 3 OR 4 EACH: CPT

## 2024-05-31 PROCEDURE — 71045 X-RAY EXAM CHEST 1 VIEW: CPT

## 2024-05-31 PROCEDURE — 83735 ASSAY OF MAGNESIUM: CPT | Performed by: EMERGENCY MEDICINE

## 2024-05-31 RX ORDER — POTASSIUM CHLORIDE 20 MEQ/1
20 TABLET, EXTENDED RELEASE ORAL DAILY
Qty: 5 TABLET | Refills: 0 | Status: SHIPPED | OUTPATIENT
Start: 2024-05-31

## 2024-05-31 RX ORDER — FUROSEMIDE 40 MG/1
40 TABLET ORAL DAILY
Qty: 5 TABLET | Refills: 0 | Status: SHIPPED | OUTPATIENT
Start: 2024-05-31

## 2024-05-31 RX ORDER — SODIUM CHLORIDE 0.9 % (FLUSH) 0.9 %
10 SYRINGE (ML) INJECTION AS NEEDED
Status: DISCONTINUED | OUTPATIENT
Start: 2024-05-31 | End: 2024-05-31 | Stop reason: HOSPADM

## 2024-05-31 NOTE — TELEPHONE ENCOUNTER
PT HAS CALLED AND IS IN BAD PAIN. INSURANCE DID NOT COVER ALL THE MEDS THAT WAS CALLED IN YESTERDAY. DO WE HAVE ANY SAMPLES OR ANYTHING TO HELP

## 2024-05-31 NOTE — ED PROVIDER NOTES
Subjective   History of Present Illness    Pt presents with leg swelling.  Onset a few weeks ago, getting worse.  Some swelling in his hands last few days as well.  He has a rash on his palms with a few lesions on the trunk and one on the left ear.  He denies fever, cough, dyspnea, chest pain.  He has had some itching all over.  He was seen here a few weeks ago and was told his liver was abnormal and referred to GI.  He saw them and they ordered some additional testing, he was supposed to get it done but hasn't been to the outpatient lab.    He has a history of DM that is not well controlled.  He says he was started on a new medication for that and had first dose today, was told it would help the leg swelling as well.  He denies other known PMH.      History provided by:  Patient      Review of Systems   Constitutional:  Negative for fever.   Respiratory:  Negative for cough and shortness of breath.    Cardiovascular:  Positive for leg swelling.   Skin:  Positive for rash.   All other systems reviewed and are negative.      Past Medical History:   Diagnosis Date    Tobacco use     Cessation encouraged    Type 2 diabetes mellitus        No Known Allergies    History reviewed. No pertinent surgical history.    Family History   Problem Relation Age of Onset    Diabetes Father     Diabetes Child        Social History     Socioeconomic History    Marital status:    Tobacco Use    Smoking status: Some Days     Types: Cigarettes     Passive exposure: Current    Smokeless tobacco: Never   Vaping Use    Vaping status: Never Used   Substance and Sexual Activity    Drug use: Yes     Types: Marijuana    Sexual activity: Yes     Partners: Female           Objective   Physical Exam  Vitals and nursing note reviewed.   Constitutional:       General: He is not in acute distress.     Appearance: Normal appearance. He is not ill-appearing.   HENT:      Head: Normocephalic and atraumatic.      Mouth/Throat:      Mouth: Mucous  membranes are moist.   Eyes:      General: No scleral icterus.        Right eye: No discharge.         Left eye: No discharge.      Conjunctiva/sclera: Conjunctivae normal.   Cardiovascular:      Rate and Rhythm: Normal rate and regular rhythm.      Heart sounds: Murmur (2/6 systolic) heard.   Pulmonary:      Effort: Pulmonary effort is normal. No respiratory distress.      Breath sounds: Normal breath sounds. No wheezing.   Abdominal:      General: Bowel sounds are normal. There is no distension.      Palpations: Abdomen is soft.      Tenderness: There is no abdominal tenderness. There is no guarding or rebound.   Musculoskeletal:         General: No swelling. Normal range of motion.      Cervical back: Normal range of motion and neck supple.   Skin:     General: Skin is warm and dry.      Findings: No rash.   Neurological:      General: No focal deficit present.      Mental Status: He is alert and oriented to person, place, and time. Mental status is at baseline.   Psychiatric:         Mood and Affect: Mood normal.         Behavior: Behavior normal.         Thought Content: Thought content normal.         Procedures           ED Course    I reviewed prior records.  Endocrinology note from 5/30/24 notes severe hyperglycemia with elevated A1c and meds were changed.  I reviewed GI note from 5/20/24 for elevated LFTs and pruritus, he was given Questran and further workup was ordered.  There is a PCP visit note from 2/21/24 for left leg swelling that notes he had improvement with Lasix.      He has a heart murmur on exam, says he has never been told that.  His hand lesions don't really look like janeway lesions and he has the rash on other parts of him where those lesions aren't expected to appear but with murmur and a rash and abnormal LFTs recently I feel endocarditis must be considered.    We have asked the lab to run his GI-ordered labs and we sent up separately labeled tubes for that purpose but they are showing  up on the ED workup anyway.    CXR read by me, no acute process no active CHF.  Radiology report reviewed.    EKG read by me, NSR.    Echocardiogram ordered.  He has TR with no report of vegetations seen.  EF normal.    Labs are benign here, nonspecific CRP and ESR elevations.  There are some studies that will not result tonight.  Discussed with patient and family that they will need to keep outpatient followup as scheduled to discuss those results.    Patient stable on serial rechecks.  Discussed findings, concerns, plan of care, expected course, reasons to return and followup.  Provided the opportunity to ask questions.    Recommend diuretics plus compression stockings short term for symptomatic pedal edema.  Needs continued workup for underlying pathology and he is encouraged to keep all follow up appts.                                         Medical Decision Making  Problems Addressed:  Pedal edema: complicated acute illness or injury  Rash: complicated acute illness or injury  Tricuspid valve insufficiency, unspecified etiology: undiagnosed new problem with uncertain prognosis    Amount and/or Complexity of Data Reviewed  Independent Historian: spouse  External Data Reviewed: notes.  Labs: ordered. Decision-making details documented in ED Course.  Radiology: ordered and independent interpretation performed. Decision-making details documented in ED Course.  ECG/medicine tests: ordered and independent interpretation performed. Decision-making details documented in ED Course.    Risk  Prescription drug management.        Final diagnoses:   Pedal edema   Rash   Tricuspid valve insufficiency, unspecified etiology       ED Disposition  ED Disposition       ED Disposition   Discharge    Condition   Stable    Comment   --               Warren Lewis MD  [address] [address]  [phone]    On 6/6/2024  As scheduled    Your doctor    In 1 week           Medication List        New  Prescriptions      furosemide 40 MG tablet  Commonly known as: LASIX  Take 1 tablet by mouth Daily.     potassium chloride 20 MEQ CR tablet  Commonly known as: KLOR-CON M20  Take 1 tablet by mouth Daily.               Where to Get Your Medications        These medications were sent to [facility] - [place], [place] - [address] - [phone]  - [phone] FX  [address], [facility] [address]      Phone: [phone]   furosemide 40 MG tablet  potassium chloride 20 MEQ CR tablet            Daniel Montemayor MD  05/31/24 6947

## 2024-05-31 NOTE — TELEPHONE ENCOUNTER
AYALA MOLINA (Key: [id number])  PA Case ID #: [id number]  Rx #: [id number]  Need Help? Call us at [phone]  Outcome  Approved today  Your PA request has been approved. Additional information will be provided in the approval communication. (Message 1147)  Authorization Expiration Date: 5/30/2025  Drug  Jardiance 25MG tablets  ePA cloud logo  Form  [facility] Electronic PA Form (2017 [employer])

## 2024-05-31 NOTE — TELEPHONE ENCOUNTER
I called patient and informed him Mahnaz Oviedo wants him to complete labs from 5/21, 5/22, and 5/23. Patient stated he is having swelling in his hands and body as well as spot on his hands. I instructed patient to go to the ER to be assessed. Patient verbalized understanding and had no further questions. I also had  Peace Nesbitt put him in for an office visit on 06/06/2024 with Dr Lewis. Patient called back from ER and he and his wife notified of office visit with Dr Lewis for June 6. Patient and wife verbalized understanding date, time and location for the appointment and had no further questions.

## 2024-06-01 LAB
ASCENDING AORTA: 3 CM
BH CV ECHO MEAS - AO MAX PG: 15 MMHG
BH CV ECHO MEAS - AO MEAN PG: 8 MMHG
BH CV ECHO MEAS - AO ROOT DIAM: 3.2 CM
BH CV ECHO MEAS - AO V2 MAX: 193 CM/SEC
BH CV ECHO MEAS - AO V2 VTI: 35.6 CM
BH CV ECHO MEAS - AVA(I,D): 2 CM2
BH CV ECHO MEAS - EF(MOD-BP): 63.2 %
BH CV ECHO MEAS - IVS/LVPW: 1 CM
BH CV ECHO MEAS - IVSD: 1.4 CM
BH CV ECHO MEAS - LA DIMENSION: 4.4 CM
BH CV ECHO MEAS - LAT PEAK E' VEL: 18.7 CM/SEC
BH CV ECHO MEAS - LV MAX PG: 10.2 MMHG
BH CV ECHO MEAS - LV V1 MAX: 157.1 CM/SEC
BH CV ECHO MEAS - LVIDD: 4.3 CM
BH CV ECHO MEAS - LVIDS: 2.3 CM
BH CV ECHO MEAS - LVOT DIAM: 1.9 CM
BH CV ECHO MEAS - LVPWD: 1.3 CM
BH CV ECHO MEAS - MED PEAK E' VEL: 9.6 CM/SEC
BH CV ECHO MEAS - MV A MAX VEL: 73 CM/SEC
BH CV ECHO MEAS - MV E MAX VEL: 113 CM/SEC
BH CV ECHO MEAS - MV E/A: 1.32
BH CV ECHO MEAS - MV MAX PG: 7.2 MMHG
BH CV ECHO MEAS - MV MEAN PG: 2.1 MMHG
BH CV ECHO MEAS - MV V2 VTI: 38.2 CM
BH CV ECHO MEAS - MVA(P1/2T): 2.9 CM2
BH CV ECHO MEAS - PA ACC TIME: 0.09 SEC
BH CV ECHO MEAS - PA V2 MAX: 130.5 CM/SEC
BH CV ECHO MEAS - PAPD(PI EDV): 2 MMHG
BH CV ECHO MEAS - PI END-D VEL: 68.3 CM/SEC
BH CV ECHO MEAS - RAP SYSTOLE: 8 MMHG
BH CV ECHO MEAS - RV MAX PG: 1.7 MMHG
BH CV ECHO MEAS - RV V1 MAX: 65 CM/SEC
BH CV ECHO MEAS - RV V1 VTI: 15.4 CM
BH CV ECHO MEAS - RVSP: 39 MMHG
BH CV ECHO MEAS - TAPSE (>1.6): 2.53 CM
BH CV ECHO MEAS - TR MAX PG: 31.4 MMHG
BH CV ECHO MEAS - TR MAX VEL: 279.3 CM/SEC
BH CV ECHO MEASUREMENTS AVERAGE E/E' RATIO: 7.99
BH CV XLRA - RV BASE: 4.6 CM
BH CV XLRA - RV LENGTH: 8.6 CM
BH CV XLRA - RV MID: 4 CM
BH CV XLRA - TDI S': 14.4 CM/SEC
IVRT: 75 MS
LEFT ATRIUM VOLUME INDEX: 33.4 ML/M2
LV EF 2D ECHO EST: 63.2 %
MV VALVE AREA BY PLANIMETRY: 1.57 CM2
RPR SER QL: REACTIVE
RPR SER-TITR: ABNORMAL {TITER}

## 2024-06-02 ENCOUNTER — TELEPHONE (OUTPATIENT)
Dept: EMERGENCY DEPT | Facility: HOSPITAL | Age: 59
End: 2024-06-02
Payer: COMMERCIAL

## 2024-06-02 ENCOUNTER — HOSPITAL ENCOUNTER (EMERGENCY)
Facility: HOSPITAL | Age: 59
Discharge: HOME | End: 2024-06-02
Attending: EMERGENCY MEDICINE | Admitting: EMERGENCY MEDICINE
Payer: COMMERCIAL

## 2024-06-02 VITALS
HEART RATE: 91 BPM | SYSTOLIC BLOOD PRESSURE: 168 MMHG | HEIGHT: 73 IN | TEMPERATURE: 98.2 F | WEIGHT: 193 LBS | DIASTOLIC BLOOD PRESSURE: 96 MMHG | RESPIRATION RATE: 20 BRPM | OXYGEN SATURATION: 96 % | BODY MASS INDEX: 25.58 KG/M2

## 2024-06-02 DIAGNOSIS — A53.9 SYPHILIS IN MALE: Primary | ICD-10-CM

## 2024-06-02 PROCEDURE — 99282 EMERGENCY DEPT VISIT SF MDM: CPT

## 2024-06-02 RX ORDER — DOXYCYCLINE 100 MG/1
100 CAPSULE ORAL 2 TIMES DAILY
Qty: 28 CAPSULE | Refills: 0 | Status: SHIPPED | OUTPATIENT
Start: 2024-06-02

## 2024-06-02 NOTE — ED PROVIDER NOTES
Subjective   History of Present Illness  50-year-old male presents emergency department today after being called earlier this morning for positive RPR.  He was here couple days ago had a rash on his palms RPR was sent off and resulted this morning.    History provided by:  Patient and relative   used: No    Rash  Location: palms and souls feet.  Severity:  Moderate  Onset quality:  Sudden  Duration:  4 days  Timing:  Constant  Chronicity:  New  Context comment:  +RPR  Relieved by:  Nothing  Worsened by:  Nothing  Ineffective treatments:  None tried  Associated symptoms: no diarrhea, no fever, no headaches, no hoarse voice, no nausea, no periorbital edema, no shortness of breath, no throat swelling, no tongue swelling, no URI and not wheezing        Review of Systems   Constitutional:  Negative for fever.   HENT:  Negative for hoarse voice.    Respiratory:  Negative for shortness of breath and wheezing.    Gastrointestinal:  Negative for diarrhea and nausea.   Skin:  Positive for rash.   Neurological:  Negative for headaches.       Past Medical History:   Diagnosis Date    Tobacco use     Cessation encouraged    Type 2 diabetes mellitus        No Known Allergies    No past surgical history on file.    Family History   Problem Relation Age of Onset    Diabetes Father     Diabetes Child        Social History     Socioeconomic History    Marital status:    Tobacco Use    Smoking status: Some Days     Types: Cigarettes     Passive exposure: Current    Smokeless tobacco: Never   Vaping Use    Vaping status: Never Used   Substance and Sexual Activity    Drug use: Yes     Types: Marijuana    Sexual activity: Yes     Partners: Female           Objective   Physical Exam  Vitals and nursing note reviewed.   Constitutional:       Appearance: He is well-developed.   HENT:      Head: Normocephalic and atraumatic.      Right Ear: External ear normal.      Left Ear: External ear normal.      Nose: Nose  normal.   Eyes:      General: No scleral icterus.     Conjunctiva/sclera: Conjunctivae normal.   Neck:      Thyroid: No thyromegaly.   Cardiovascular:      Rate and Rhythm: Normal rate and regular rhythm.      Heart sounds: Murmur heard.   Pulmonary:      Effort: Pulmonary effort is normal. No respiratory distress.      Breath sounds: Normal breath sounds. No wheezing or rales.   Chest:      Chest wall: No tenderness.   Abdominal:      General: Bowel sounds are normal. There is no distension.      Palpations: Abdomen is soft.      Tenderness: There is no abdominal tenderness.   Musculoskeletal:         General: Normal range of motion.      Cervical back: Normal range of motion.   Lymphadenopathy:      Cervical: No cervical adenopathy.   Skin:     General: Skin is warm and dry.      Comments: Macular rash on bilateral palms   Neurological:      Mental Status: He is alert and oriented to person, place, and time.      Cranial Nerves: No cranial nerve deficit.      Coordination: Coordination normal.      Deep Tendon Reflexes: Reflexes are normal and symmetric. Reflexes normal.   Psychiatric:         Behavior: Behavior normal.         Thought Content: Thought content normal.         Judgment: Judgment normal.         Procedures           ED Course  ED Course as of 06/02/24 1443   Sun Jun 02, 2024   1443 Positive RPR.  Bicillin LA is not available nationally.  Secondary treat would be doxycycline 100 mg p.o. twice daily for 14 days. [ROMEL]      ED Course User Index  [ROMEL] Jovanny Zheng PA                                   No results found for this or any previous visit (from the past 24 hour(s)).  Note: In addition to lab results from this visit, the labs listed above may include labs taken at another facility or during a different encounter within the last 24 hours. Please correlate lab times with ED admission and discharge times for further clarification of the services performed during this visit.    No orders to  "display     Vitals:    06/02/24 0810   BP: 168/96   Pulse: 91   Resp: 20   Temp: 98.2 °F (36.8 °C)   TempSrc: Oral   SpO2: 96%   Weight: 87.5 kg (193 lb)   Height: 185.4 cm (73\")     Medications - No data to display  ECG/EMG Results (last 24 hours)       ** No results found for the last 24 hours. **          No orders to display                 Medical Decision Making  Problems Addressed:  Syphilis in male: complicated acute illness or injury    Risk  Prescription drug management.        Final diagnoses:   Syphilis in male       ED Disposition  ED Disposition       ED Disposition   Discharge    Condition   Stable    Comment   --               PATIENT CONNECTION - [facility] [address]  [phone]    Couple weeks with your primary care doctor.         Medication List        New Prescriptions      doxycycline 100 MG capsule  Commonly known as: MONODOX  Take 1 capsule by mouth 2 (Two) Times a Day.               Where to Get Your Medications        These medications were sent to [facility] - [place], [place] - [address] - [phone] [place] [phone] FX  [address], [facility] [address]      Phone: [phone]   doxycycline 100 MG capsule            Jovanny Zheng PA  06/02/24 1443    "

## 2024-06-03 ENCOUNTER — NURSE TRIAGE (OUTPATIENT)
Dept: CALL CENTER | Facility: HOSPITAL | Age: 59
End: 2024-06-03
Payer: COMMERCIAL

## 2024-06-03 LAB
IGG4 SER-MCNC: 47 MG/DL (ref 2–96)
LKM-1 AB SER-ACNC: 2 UNITS (ref 0–20)

## 2024-06-03 NOTE — TELEPHONE ENCOUNTER
He has a dexcom meter- His Blood sugar is 56. He ate this am at 0400 am and took all his medications. He did eat a small snack before he took a nap. He wanted to know why his blood sugar was low. Explained he did eat enough prior to raking insulin. He is a diabetic. He needs to consume small meals through out the day and not go 12 hours or more with out eating.     He is eating peanut butter crackers and a glass of milk. He states he feels fine, no symptoms. Care advice given.   Reason for Disposition   [1] Blood glucose 70 mg/dl (3.9 mmol/l) or below, OR symptomatic AND [2] cause known    Additional Information   Negative: Unconscious or difficult to awaken   Negative: Seizure occurs   Negative: Acting confused (e.g., disoriented, slurred speech)   Negative: Very weak (e.g., can't stand)   Negative: Sounds like a life-threatening emergency to the triager   Negative: [1] Vomiting AND [2] signs of dehydration (e.g., very dry mouth, lightheaded, dark urine)   Negative: [1] Low blood sugar symptoms persist > 30 minutes AND [2] using low blood sugar Care Advice   Negative: [1] Low blood glucose (70 mg/dl [3.9 mmol/l] or below) persists > 30 minutes AND [2] using low blood sugar Care Advice   Negative: Patient sounds very sick or weak to the triager   Negative: [1] Low blood sugar symptoms with no other adult present AND [2] hasn't tried Care Advice   Negative: [1] Low blood glucose (70 mg/dl [3.9 mmol/l] or below)) with no other adult present AND [2] hasn't tried Care Advice   Negative: Diabetes drug error or overdose (e.g., insulin error or extra dose)   Negative: [1] Caller has URGENT medication or insulin pump question AND [2] triager unable to answer question   Negative: [1] Blood glucose 70  mg/dL (3.9 mmol/L) or below OR symptomatic, now improved with Care Advice AND [2] cause unknown   Negative: [1] Caller has NON-URGENT medication or insulin pump question AND [2] triager unable to answer question   Negative: [1]  "Morning (before breakfast) blood glucose < 80 mg/dL (4.4 mmol/L) AND [2] more than once in past week   Negative: [1] Evening (after bedtime snack) blood glucose < 100 mg/dL (5.6 mmol/L) AND [2] more than once in past week   Negative: Low blood sugar definition and treatment, questions about   Negative: Low blood sugar prevention, questions about   Negative: Sick day rules for people with diabetes who use insulin, questions about   Negative: Sick day rules for people with diabetes who do not use insulin, questions about    Answer Assessment - Initial Assessment Questions  1. SYMPTOMS: \"What symptoms are you concerned about?\"      Low blood sugar   2. ONSET:  \"When did the symptoms start?\"      No symptoms.   3. BLOOD GLUCOSE: \"What is your blood glucose level?\"       56  4. USUAL RANGE: \"What is your blood glucose level usually?\" (e.g., usual fasting morning value, usual evening value)      120  5. TYPE 1 or 2:  \"Do you know what type of diabetes you have?\"  (e.g., Type 1, Type 2, Gestational; doesn't know)       Type 1  6. INSULIN: \"Do you take insulin?\" \"What type of insulin(s) do you use? What is the mode of delivery? (syringe, pen; injection or pump) \"When did you last give yourself an insulin dose?\" (i.e., time or hours/minutes ago) \"How much did you give?\" (i.e., how many units)      Yes   7. DIABETES PILLS: \"Do you take any pills for your diabetes?\" If Yes, ask: \"What is the name of the medicine(s) that you take for high blood sugar?\"      no  8. OTHER SYMPTOMS: \"Do you have any symptoms?\" (e.g., fever, frequent urination, difficulty breathing, vomiting)      none  9. LOW BLOOD GLUCOSE TREATMENT: \"What have you done so far to treat the low blood glucose level?\"      Nothing   10. FOOD: \"When did you last eat or drink?\"        4 am   11. ALONE: \"Are you alone right now or is someone with you?\"         Have a friend with me.  12. PREGNANCY: \"Is there any chance you are pregnant?\" \"When was your last menstrual " "period?\"        no    Protocols used: Diabetes - Low Blood Sugar-ADULT-AH    "

## 2024-06-04 LAB
SOLUBLE LIVER IGG SER IA-ACNC: 2.1 UNITS (ref 0–20)
TREPONEMA PALLIDUM IGG+IGM AB [PRESENCE] IN SERUM OR PLASMA BY IMMUNOASSAY: REACTIVE

## 2024-06-05 LAB
BACTERIA SPEC AEROBE CULT: NORMAL
BACTERIA SPEC AEROBE CULT: NORMAL
IRON SATN MFR SERPL: 15 % SATURATION
IRON SERPL-MCNC: 56 UG/DL
TRANSFERRIN SERPL-MCNC: 260 MG/DL

## 2024-06-06 ENCOUNTER — LAB (OUTPATIENT)
Dept: LAB | Facility: HOSPITAL | Age: 59
End: 2024-06-06
Payer: COMMERCIAL

## 2024-06-06 ENCOUNTER — TELEPHONE (OUTPATIENT)
Dept: GASTROENTEROLOGY | Facility: CLINIC | Age: 59
End: 2024-06-06

## 2024-06-06 ENCOUNTER — OFFICE VISIT (OUTPATIENT)
Dept: GASTROENTEROLOGY | Facility: CLINIC | Age: 59
End: 2024-06-06
Payer: COMMERCIAL

## 2024-06-06 VITALS — BODY MASS INDEX: 25.05 KG/M2 | WEIGHT: 189 LBS | HEIGHT: 73 IN

## 2024-06-06 DIAGNOSIS — R79.89 ELEVATED LIVER FUNCTION TESTS: Primary | ICD-10-CM

## 2024-06-06 DIAGNOSIS — A53.9 SYPHILIS: ICD-10-CM

## 2024-06-06 DIAGNOSIS — D64.9 ANEMIA, UNSPECIFIED TYPE: ICD-10-CM

## 2024-06-06 DIAGNOSIS — D69.6 THROMBOCYTOPENIA: ICD-10-CM

## 2024-06-06 DIAGNOSIS — L29.8 CHOLESTATIC PRURITUS: ICD-10-CM

## 2024-06-06 LAB
ALBUMIN SERPL-MCNC: 3.9 G/DL (ref 3.5–5.2)
ALP SERPL-CCNC: 233 U/L (ref 39–117)
ALT SERPL W P-5'-P-CCNC: 14 U/L (ref 1–41)
AST SERPL-CCNC: 15 U/L (ref 1–40)
BILIRUB CONJ SERPL-MCNC: 0.2 MG/DL (ref 0–0.3)
BILIRUB INDIRECT SERPL-MCNC: 0.4 MG/DL
BILIRUB SERPL-MCNC: 0.6 MG/DL (ref 0–1.2)
PROT SERPL-MCNC: 8.6 G/DL (ref 6–8.5)

## 2024-06-06 PROCEDURE — 99214 OFFICE O/P EST MOD 30 MIN: CPT | Performed by: INTERNAL MEDICINE

## 2024-06-06 PROCEDURE — 36415 COLL VENOUS BLD VENIPUNCTURE: CPT | Performed by: INTERNAL MEDICINE

## 2024-06-06 PROCEDURE — 80076 HEPATIC FUNCTION PANEL: CPT | Performed by: INTERNAL MEDICINE

## 2024-06-06 PROCEDURE — 82947 ASSAY GLUCOSE BLOOD QUANT: CPT | Performed by: INTERNAL MEDICINE

## 2024-06-06 PROCEDURE — 82977 ASSAY OF GGT: CPT | Performed by: INTERNAL MEDICINE

## 2024-06-06 PROCEDURE — 84478 ASSAY OF TRIGLYCERIDES: CPT | Performed by: INTERNAL MEDICINE

## 2024-06-06 PROCEDURE — 82465 ASSAY BLD/SERUM CHOLESTEROL: CPT | Performed by: INTERNAL MEDICINE

## 2024-06-06 PROCEDURE — 83883 ASSAY NEPHELOMETRY NOT SPEC: CPT | Performed by: INTERNAL MEDICINE

## 2024-06-06 PROCEDURE — 86381 MITOCHONDRIAL ANTIBODY EACH: CPT | Performed by: INTERNAL MEDICINE

## 2024-06-06 PROCEDURE — 82172 ASSAY OF APOLIPOPROTEIN: CPT | Performed by: INTERNAL MEDICINE

## 2024-06-06 PROCEDURE — 83010 ASSAY OF HAPTOGLOBIN QUANT: CPT | Performed by: INTERNAL MEDICINE

## 2024-06-06 RX ORDER — SODIUM, POTASSIUM,MAG SULFATES 17.5-3.13G
SOLUTION, RECONSTITUTED, ORAL ORAL
Qty: 354 ML | Refills: 0 | Status: SHIPPED | OUTPATIENT
Start: 2024-06-06 | End: 2024-06-06

## 2024-06-06 NOTE — PROGRESS NOTES
PCP:  Provider, No Known     No referring provider defined for this encounter.    Chief Complaint   Patient presents with    Follow-up     Follow up- elevated LFT's         HPI   The patient is a 58-year-old who I am seeing for the first time.  He has a history of diabetes and hypertension.  He has a history of tobacco use.  He has recently been diagnosed with syphilis.  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  He was having pruritus.  This seems to have improved.  He is on Actigall.  He stopped his Questran.  His liver chemistries were quite elevated.  He has no family history of liver disease.  He is not a drinker and has not been a heavy drinker.  His serologies were essentially negative with the exception of his antimitochondrial antibody.  Ceruloplasmin was normal, alpha-1 antitrypsin level was normal.  His hepatitis B studies were negative.  His hepatitis C study was negative.  Hepatitis A antibody was negative.  His autoimmune markers were negative.  His iron studies were negative.  His IgG4 level was negative.  His CT was normal 518.  His hemoglobin was 10.7 hematocrit 32.8 with a white blood cell count of 4.61 and a platelet count of 132.  Looking back he is been thrombocytopenic for many years.  He has no gross bleeding.  Overall he feels much better.  His blood sugars have been under better control.  1 abnormality is his antimitochondrial antibody was elevated at 62.8.  He did have an elevated bilirubin and alkaline phosphatase as well.  He is finishing treatment with penicillin.    No Known Allergies       Current Outpatient Medications:     BD Pen Needle Micro U/F 32G X 6 MM misc, , Disp: , Rfl:     cholestyramine (QUESTRAN) 4 GM/DOSE powder, Take 1 packet by mouth 2 (Two) Times a Day With Meals. mixed with a liquid, Disp: 378 g, Rfl: 3    clobetasol (TEMOVATE) 0.05 % ointment, Apply 1 Application topically to the  appropriate area as directed Every Night., Disp: 60 g, Rfl: 0    Continuous Glucose Sensor (FreeStyle Wilbert 3 Sensor) misc, Use 1 each Every 14 (Fourteen) Days., Disp: 6 each, Rfl: 3    doxycycline (MONODOX) 100 MG capsule, Take 1 capsule by mouth 2 (Two) Times a Day., Disp: 28 capsule, Rfl: 0    empagliflozin (JARDIANCE) 25 MG tablet tablet, Take 1 tablet by mouth Daily., Disp: 90 tablet, Rfl: 3    furosemide (LASIX) 40 MG tablet, Take 1 tablet by mouth Daily., Disp: 5 tablet, Rfl: 0    hydrOXYzine pamoate (Vistaril) 25 MG capsule, Take 1 capsule by mouth 3 (Three) Times a Day As Needed for Itching., Disp: 20 capsule, Rfl: 0     MG tablet, , Disp: , Rfl:     insulin aspart (novoLOG FLEXPEN) 100 UNIT/ML solution pen-injector sc pen, Inject 20 Units under the skin into the appropriate area as directed 3 (Three) Times a Day With Meals., Disp: , Rfl:     Lantus SoloStar 100 UNIT/ML injection pen, Inject 40 Units under the skin into the appropriate area as directed Daily., Disp: , Rfl:     potassium chloride (KLOR-CON M20) 20 MEQ CR tablet, Take 1 tablet by mouth Daily., Disp: 5 tablet, Rfl: 0    ursodiol (ACTIGALL) 300 MG capsule, Take 1 capsule by mouth 3 (Three) Times a Day., Disp: 180 capsule, Rfl: 3     Past Medical History:   Diagnosis Date    Tobacco use     Cessation encouraged    Type 2 diabetes mellitus        History reviewed. No pertinent surgical history.     Social History     Socioeconomic History    Marital status:    Tobacco Use    Smoking status: Some Days     Types: Cigarettes     Passive exposure: Current    Smokeless tobacco: Never   Vaping Use    Vaping status: Never Used   Substance and Sexual Activity    Drug use: Yes     Types: Marijuana    Sexual activity: Yes     Partners: Female        Family History   Problem Relation Age of Onset    Diabetes Father     Diabetes Child         Review of Systems     There were no vitals filed for this visit.     Physical Exam  Constitutional:        General: He is not in acute distress.     Appearance: Normal appearance. He is not ill-appearing.   Skin:     Findings: Rash present.   Neurological:      Mental Status: He is alert.          Diagnoses and all orders for this visit:    1. Elevated liver function tests (Primary)  -     Mitochondrial Antibodies, M2  -     Hepatic Function Panel  -     REYES Fibrosure  -     Cancel: Gamma GT    2. Cholestatic pruritus  -     Mitochondrial Antibodies, M2  -     Hepatic Function Panel  -     REYES Fibrosure  -     Cancel: Gamma GT    3. Anemia, unspecified type    4. Thrombocytopenia    5. Syphilis    Impressions and plan #1 elevated liver tests: On 5/17/2024 his albumin was 3.6, , , alk phos 508, and bilirubin 2.2.  On 5/31/2024, his albumin was 3.4, ALT normal at 32, AST 17, alk phos 283, and bilirubin 0.8.  His itching has resolved largely.  He continues his Actigall.  I am going to check a GGT and recheck his liver chemistries.  I am going to repeat antimitochondrial antibody.  90% of these patients are women.  Hopefully this is normalized.  I am going to check an REYES Fibrosure as well.    #2 anemia: I would like to check a colonoscopy as well as an upper endoscopy.  At the time of the upper endoscopy of course we will look for varices.    #3 thrombocytopenia: This has been a long-term problem which certainly brings about the concern of liver disease.  He is also anemic.  He may need to see a hematologist.      #4 secondary syphilis: He has characteristic rash on the palms.  He also had a positive Treponema pallidum antibody.  I think he be better we have him seen by infectious disease.    Warren Lewis MD

## 2024-06-06 NOTE — LETTER
June 6, 2024       No Recipients    Patient: Salvador Cortez   YOB: 1965   Date of Visit: 6/6/2024     Dear Salvador Fraseriscoe:       Thank you for referring Salvador Cortez to me for evaluation. Below are the relevant portions of my assessment and plan of care.    If you have questions, please do not hesitate to call me. I look forward to following Salvador along with you.         Sincerely,        Warren Lewis MD        CC:   No Recipients    Warren Lewis MD  06/06/24 1644  Sign when Signing Visit  PCP:  Provider, No Known     No referring provider defined for this encounter.    Chief Complaint   Patient presents with   • Follow-up     Follow up- elevated LFT's         HPI   The patient is a 58-year-old who I am seeing for the first time.  He has a history of diabetes and hypertension.  He has a history of tobacco use.  He has recently been diagnosed with syphilis.  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  He was having pruritus.  This seems to have improved.  He is on Actigall.  He stopped his Questran.  His liver chemistries were quite elevated.  He has no family history of liver disease.  He is not a drinker and has not been a heavy drinker.  His serologies were essentially negative with the exception of his antimitochondrial antibody.  Ceruloplasmin was normal, alpha-1 antitrypsin level was normal.  His hepatitis B studies were negative.  His hepatitis C study was negative.  Hepatitis A antibody was negative.  His autoimmune markers were negative.  His iron studies were negative.  His IgG4 level was negative.  His CT was normal 518.  His hemoglobin was 10.7 hematocrit 32.8 with a white blood cell count of 4.61 and a platelet count of 132.  Looking back he is been thrombocytopenic for many years.  He has no gross bleeding.  Overall he feels much better.  His blood sugars have been under better  control.  1 abnormality is his antimitochondrial antibody was elevated at 62.8.  He did have an elevated bilirubin and alkaline phosphatase as well.  He is finishing treatment with penicillin.    No Known Allergies       Current Outpatient Medications:   •  BD Pen Needle Micro U/F 32G X 6 MM misc, , Disp: , Rfl:   •  cholestyramine (QUESTRAN) 4 GM/DOSE powder, Take 1 packet by mouth 2 (Two) Times a Day With Meals. mixed with a liquid, Disp: 378 g, Rfl: 3  •  clobetasol (TEMOVATE) 0.05 % ointment, Apply 1 Application topically to the appropriate area as directed Every Night., Disp: 60 g, Rfl: 0  •  Continuous Glucose Sensor (FreeStyle Wilbert 3 Sensor) misc, Use 1 each Every 14 (Fourteen) Days., Disp: 6 each, Rfl: 3  •  doxycycline (MONODOX) 100 MG capsule, Take 1 capsule by mouth 2 (Two) Times a Day., Disp: 28 capsule, Rfl: 0  •  empagliflozin (JARDIANCE) 25 MG tablet tablet, Take 1 tablet by mouth Daily., Disp: 90 tablet, Rfl: 3  •  furosemide (LASIX) 40 MG tablet, Take 1 tablet by mouth Daily., Disp: 5 tablet, Rfl: 0  •  hydrOXYzine pamoate (Vistaril) 25 MG capsule, Take 1 capsule by mouth 3 (Three) Times a Day As Needed for Itching., Disp: 20 capsule, Rfl: 0  •   MG tablet, , Disp: , Rfl:   •  insulin aspart (novoLOG FLEXPEN) 100 UNIT/ML solution pen-injector sc pen, Inject 20 Units under the skin into the appropriate area as directed 3 (Three) Times a Day With Meals., Disp: , Rfl:   •  Lantus SoloStar 100 UNIT/ML injection pen, Inject 40 Units under the skin into the appropriate area as directed Daily., Disp: , Rfl:   •  potassium chloride (KLOR-CON M20) 20 MEQ CR tablet, Take 1 tablet by mouth Daily., Disp: 5 tablet, Rfl: 0  •  ursodiol (ACTIGALL) 300 MG capsule, Take 1 capsule by mouth 3 (Three) Times a Day., Disp: 180 capsule, Rfl: 3     Past Medical History:   Diagnosis Date   • Tobacco use     Cessation encouraged   • Type 2 diabetes mellitus        History reviewed. No pertinent surgical history.      Social History     Socioeconomic History   • Marital status:    Tobacco Use   • Smoking status: Some Days     Types: Cigarettes     Passive exposure: Current   • Smokeless tobacco: Never   Vaping Use   • Vaping status: Never Used   Substance and Sexual Activity   • Drug use: Yes     Types: Marijuana   • Sexual activity: Yes     Partners: Female        Family History   Problem Relation Age of Onset   • Diabetes Father    • Diabetes Child         Review of Systems     There were no vitals filed for this visit.     Physical Exam  Constitutional:       General: He is not in acute distress.     Appearance: Normal appearance. He is not ill-appearing.   Skin:     Findings: Rash present.   Neurological:      Mental Status: He is alert.          Diagnoses and all orders for this visit:    1. Elevated liver function tests (Primary)  -     Mitochondrial Antibodies, M2  -     Hepatic Function Panel  -     REYES Fibrosure  -     Cancel: Gamma GT    2. Cholestatic pruritus  -     Mitochondrial Antibodies, M2  -     Hepatic Function Panel  -     REYES Fibrosure  -     Cancel: Gamma GT    3. Anemia, unspecified type    4. Thrombocytopenia    5. Syphilis    Impressions and plan #1 elevated liver tests: On 5/17/2024 his albumin was 3.6, , , alk phos 508, and bilirubin 2.2.  On 5/31/2024, his albumin was 3.4, ALT normal at 32, AST 17, alk phos 283, and bilirubin 0.8.  His itching has resolved largely.  He continues his Actigall.  I am going to check a GGT and recheck his liver chemistries.  I am going to repeat antimitochondrial antibody.  90% of these patients are women.  Hopefully this is normalized.  I am going to check an REYES Fibrosure as well.    #2 anemia: I would like to check a colonoscopy as well as an upper endoscopy.  At the time of the upper endoscopy of course we will look for varices.    #3 thrombocytopenia: This has been a long-term problem which certainly brings about the concern of liver  disease.  He is also anemic.  He may need to see a hematologist.      #4 secondary syphilis: He has characteristic rash on the palms.  He also had a positive Treponema pallidum antibody.  I think he be better we have him seen by infectious disease.    Warren Lewis MD

## 2024-06-06 NOTE — TELEPHONE ENCOUNTER
Name: MARIA VICTORIA MOLINA    Relationship: Emergency Contact    Best Callback Number: [phone]    Patient would like to Reschedule their appointment. Unable to schedule within the 3 week timeframe.     Patient is not having any symptoms. Please call patient. If not able to reach pt wife it is okay to leave an voicemail.

## 2024-06-08 LAB — MITOCHONDRIA M2 IGG SER-ACNC: 69.8 UNITS (ref 0–20)

## 2024-06-10 DIAGNOSIS — A53.9 SYPHILIS: Primary | ICD-10-CM

## 2024-06-11 LAB
A2 MACROGLOB SERPL-MCNC: 173 MG/DL (ref 110–276)
ALT SERPL W P-5'-P-CCNC: 18 IU/L (ref 0–55)
APO A-I SERPL-MCNC: 124 MG/DL (ref 101–178)
AST SERPL W P-5'-P-CCNC: 18 IU/L (ref 0–40)
BILIRUB SERPL-MCNC: 0.4 MG/DL (ref 0–1.2)
CHOLEST SERPL-MCNC: 166 MG/DL (ref 100–199)
FIBROSIS SCORING:: ABNORMAL
FIBROSIS STAGE SERPL QL: ABNORMAL
GGT SERPL-CCNC: 221 IU/L (ref 0–65)
GLUCOSE SERPL-MCNC: 151 MG/DL (ref 70–99)
HAPTOGLOB SERPL-MCNC: 114 MG/DL (ref 29–370)
LABORATORY COMMENT REPORT: ABNORMAL
LIVER FIBR SCORE SERPL CALC.FIBROSURE: 0.44 (ref 0–0.21)
LIVER STEATOSIS GRADE SERPL QL: ABNORMAL
LIVER STEATOSIS SCORE SERPL: 0.85 (ref 0–0.4)
NASH GRADE SERPL QL: ABNORMAL
NASH INTERPRETATION SERPL-IMP: ABNORMAL
NASH SCORE SERPL: 0.49 (ref 0–0.25)
NASH SCORING: ABNORMAL
STEATOSIS SCORING: ABNORMAL
TEST PERFORMANCE INFO SPEC: ABNORMAL
TEST PERFORMANCE INFO SPEC: ABNORMAL
TRIGL SERPL-MCNC: 224 MG/DL (ref 0–149)

## 2024-06-18 RX ORDER — SODIUM, POTASSIUM,MAG SULFATES 17.5-3.13G
2 SOLUTION, RECONSTITUTED, ORAL ORAL TAKE AS DIRECTED
Qty: 354 ML | Refills: 0 | Status: SHIPPED | OUTPATIENT
Start: 2024-06-18

## 2024-06-19 ENCOUNTER — OUTSIDE FACILITY SERVICE (OUTPATIENT)
Dept: GASTROENTEROLOGY | Facility: CLINIC | Age: 59
End: 2024-06-19
Payer: COMMERCIAL

## 2024-06-19 DIAGNOSIS — R79.89 ELEVATED LIVER FUNCTION TESTS: ICD-10-CM

## 2024-06-19 PROCEDURE — 45385 COLONOSCOPY W/LESION REMOVAL: CPT | Performed by: INTERNAL MEDICINE

## 2024-06-19 PROCEDURE — 43239 EGD BIOPSY SINGLE/MULTIPLE: CPT | Performed by: INTERNAL MEDICINE

## 2024-06-19 PROCEDURE — 88305 TISSUE EXAM BY PATHOLOGIST: CPT | Performed by: INTERNAL MEDICINE

## 2024-06-20 ENCOUNTER — LAB REQUISITION (OUTPATIENT)
Dept: LAB | Facility: HOSPITAL | Age: 59
End: 2024-06-20
Payer: COMMERCIAL

## 2024-06-20 DIAGNOSIS — K44.9 DIAPHRAGMATIC HERNIA WITHOUT OBSTRUCTION OR GANGRENE: ICD-10-CM

## 2024-06-20 DIAGNOSIS — D12.5 BENIGN NEOPLASM OF SIGMOID COLON: ICD-10-CM

## 2024-06-20 DIAGNOSIS — D64.9 ANEMIA, UNSPECIFIED: ICD-10-CM

## 2024-06-20 DIAGNOSIS — D12.0 BENIGN NEOPLASM OF CECUM: ICD-10-CM

## 2024-06-20 DIAGNOSIS — Z12.11 ENCOUNTER FOR SCREENING FOR MALIGNANT NEOPLASM OF COLON: ICD-10-CM

## 2024-06-20 DIAGNOSIS — K29.70 GASTRITIS, UNSPECIFIED, WITHOUT BLEEDING: ICD-10-CM

## 2024-06-20 DIAGNOSIS — R79.89 OTHER SPECIFIED ABNORMAL FINDINGS OF BLOOD CHEMISTRY: ICD-10-CM

## 2024-06-20 DIAGNOSIS — K22.2 ESOPHAGEAL OBSTRUCTION: ICD-10-CM

## 2024-06-20 DIAGNOSIS — D12.3 BENIGN NEOPLASM OF TRANSVERSE COLON: ICD-10-CM

## 2024-06-21 ENCOUNTER — TELEPHONE (OUTPATIENT)
Dept: GASTROENTEROLOGY | Facility: CLINIC | Age: 59
End: 2024-06-21
Payer: COMMERCIAL

## 2024-06-21 LAB — REF LAB TEST METHOD: NORMAL

## 2024-06-21 NOTE — TELEPHONE ENCOUNTER
Provider: DR RANDI SWIFT     Caller: AYALA MOLINA     Relationship to Patient: SELF    Phone Number: [phone]    Reason for Call: PT WOULD LIKE TO KNOW RESULTS FROM TESTING PLEASE ADVISE AND CALL PT BACK

## 2024-07-30 ENCOUNTER — TELEPHONE (OUTPATIENT)
Dept: ENDOCRINOLOGY | Facility: CLINIC | Age: 59
End: 2024-07-30
Payer: COMMERCIAL

## 2024-07-30 NOTE — TELEPHONE ENCOUNTER
Patient called office, stated that he is having isses with his foot again. Stated it is swelling real bad. He stated we had called him in a script for this the last time this happened for 5 days. Wanting to know if he can get that medication again due to not being able to miss anymore work. Said he would like this sent to  Pharmacy.

## 2024-09-04 ENCOUNTER — OFFICE VISIT (OUTPATIENT)
Dept: ENDOCRINOLOGY | Facility: CLINIC | Age: 59
End: 2024-09-04
Payer: COMMERCIAL

## 2024-09-04 VITALS
OXYGEN SATURATION: 99 % | HEIGHT: 73 IN | WEIGHT: 193 LBS | DIASTOLIC BLOOD PRESSURE: 76 MMHG | HEART RATE: 72 BPM | BODY MASS INDEX: 25.58 KG/M2 | SYSTOLIC BLOOD PRESSURE: 136 MMHG

## 2024-09-04 DIAGNOSIS — E11.9 DIABETES MELLITUS TREATED WITH INSULIN: Primary | ICD-10-CM

## 2024-09-04 DIAGNOSIS — M79.671 BILATERAL FOOT PAIN: ICD-10-CM

## 2024-09-04 DIAGNOSIS — Z79.4 DIABETES MELLITUS TREATED WITH INSULIN: Primary | ICD-10-CM

## 2024-09-04 DIAGNOSIS — M79.672 BILATERAL FOOT PAIN: ICD-10-CM

## 2024-09-04 LAB
EXPIRATION DATE: ABNORMAL
EXPIRATION DATE: ABNORMAL
GLUCOSE BLDC GLUCOMTR-MCNC: 204 MG/DL (ref 70–130)
HBA1C MFR BLD: 6.1 % (ref 4.5–5.7)
Lab: ABNORMAL
Lab: ABNORMAL

## 2024-09-04 PROCEDURE — 83036 HEMOGLOBIN GLYCOSYLATED A1C: CPT | Performed by: PHYSICIAN ASSISTANT

## 2024-09-04 PROCEDURE — 99214 OFFICE O/P EST MOD 30 MIN: CPT | Performed by: PHYSICIAN ASSISTANT

## 2024-09-04 PROCEDURE — 82947 ASSAY GLUCOSE BLOOD QUANT: CPT | Performed by: PHYSICIAN ASSISTANT

## 2024-09-04 RX ORDER — LANCETS 30 GAUGE
EACH MISCELLANEOUS
Qty: 150 EACH | Refills: 3 | Status: SHIPPED | OUTPATIENT
Start: 2024-09-04

## 2024-09-04 RX ORDER — INSULIN GLARGINE 100 [IU]/ML
30 INJECTION, SOLUTION SUBCUTANEOUS DAILY
Qty: 9 ML | Refills: 11 | Status: SHIPPED | OUTPATIENT
Start: 2024-09-04 | End: 2025-09-04

## 2024-09-04 RX ORDER — BLOOD-GLUCOSE METER
KIT MISCELLANEOUS
Qty: 1 EACH | Refills: 0 | Status: SHIPPED | OUTPATIENT
Start: 2024-09-04

## 2024-09-04 RX ORDER — BLOOD-GLUCOSE SENSOR
1 EACH MISCELLANEOUS
Qty: 6 EACH | Refills: 3 | Status: SHIPPED | OUTPATIENT
Start: 2024-09-04

## 2024-09-04 NOTE — PROGRESS NOTES
Office Note      Date: 2024  Patient Name: Salvador Cortez  MRN: [id number]  : 1965    Chief Complaint   Patient presents with    Diabetes     Type II       History of Present Illness:   Salvador Cortez is a 59 y.o. male who presents for Diabetes type 2.   Diagnosed: 2019  Current RX: basal bolus insulin, jardiance     Bg checks are done: never  Hypoglycemia : occasional    Patient has run out of his Lantus, has been out of it for about a month. Prior to running out he was doing Lantus 40 units daily. He has been doing Novolog 20 units after he eats fairly consistently. He is having some lows after taking prandial insulin.    He is not taking Jardiance. Thinks it may have been too expensive.     Wore a Wilbert sensor for about 2 weeks but was not able to afford it. He was doing better with it on.    Notes that he was on Metformin in the past, stopped it when blood glucose improved.    Has some new pain on lateral feet, likely from new shoes for new job.    Last A1c:  Hemoglobin A1C   Date Value Ref Range Status   2024 6.1 (A) 4.5 - 5.7 % Final       Changes in health since last visit: none.     DM Health Maintenance:  Ophtho: 2024  Monofilament / Foot exam: 24   Lipids/Statin: not taking a statin with last FLP showing LDL 24  KARIE: 24   TSH: 24   Aspirin: not taking  ACE/ARB: no     Subjective     Diabetic Complications:  Eyes: No  Kidneys: No  Feet: No  Heart: No          Review of Systems:   Review of Systems   Constitutional:  Negative for activity change, appetite change and fatigue.   Respiratory:  Negative for chest tightness and shortness of breath.    Gastrointestinal:  Negative for abdominal pain.   Musculoskeletal:  Positive for arthralgias. Negative for myalgias.   Neurological:  Negative for numbness.   Psychiatric/Behavioral:  The patient is not nervous/anxious.        The following portions of the patient's history were reviewed and updated as  "appropriate: allergies, current medications, past family history, past medical history, past social history, past surgical history, and problem list.    Objective     Visit Vitals  /76 (BP Location: Left arm, Patient Position: Sitting, Cuff Size: Adult)   Pulse 72   Ht 185.4 cm (73\")   Wt 87.5 kg (193 lb)   SpO2 99%   BMI 25.46 kg/m²           Physical Exam:  Physical Exam  Constitutional:       Appearance: He is well-developed.   HENT:      Head: Normocephalic and atraumatic.      Right Ear: External ear normal.      Left Ear: External ear normal.   Eyes:      Conjunctiva/sclera: Conjunctivae normal.   Cardiovascular:      Rate and Rhythm: Normal rate and regular rhythm.      Pulses:           Dorsalis pedis pulses are 2+ on the right side and 2+ on the left side.        Posterior tibial pulses are 2+ on the right side and 2+ on the left side.   Pulmonary:      Effort: Pulmonary effort is normal.      Breath sounds: Normal breath sounds.   Musculoskeletal:         General: Normal range of motion.      Cervical back: Normal range of motion.   Feet:      Right foot:      Protective Sensation: 10 sites tested.  10 sites sensed.      Skin integrity: Skin integrity normal.      Toenail Condition: Right toenails are abnormally thick.      Left foot:      Protective Sensation: 10 sites tested.  10 sites sensed.      Skin integrity: Skin integrity normal.      Toenail Condition: Left toenails are abnormally thick.      Comments: Diabetic Foot Exam Performed and Monofilament Test Performed    Bilateral lateral metatarsal tenderness  Skin:     General: Skin is warm and dry.   Psychiatric:         Behavior: Behavior normal.          Assessment / Plan      Assessment & Plan:  Diagnoses and all orders for this visit:    1. Diabetes mellitus treated with insulin (Primary)  Assessment & Plan:  Diabetes is improving with treatment.   Medication changes per orders.  Reminded to bring in blood sugar diary at next " visit.  Recommended an ADA diet.  Discussed foot care.    Patient has not been taking medication regularly, but A1c is significantly improved. Will have him restart Lantus 30 units daily and stop Novolog. Retry jardiance 25 mg daily with new insurance.     Restart Wilbert sensor for closer monitoring to allow for insulin adjustments and prevent hypoglycemia.     Plan to check fasting labs at next visit, not fasting today.    Diabetes will be reassessed in 3 months    Orders:  -     POC Glucose, Blood  -     POC Glycosylated Hemoglobin (Hb A1C)  -     Continuous Glucose Sensor (FreeStyle Wilbert 3 Sensor) misc; Use 1 each Every 14 (Fourteen) Days.  Dispense: 6 each; Refill: 3  -     empagliflozin (JARDIANCE) 25 MG tablet tablet; Take 1 tablet by mouth Daily.  Dispense: 90 tablet; Refill: 3  -     Lantus SoloStar 100 UNIT/ML injection pen; Inject 30 Units under the skin into the appropriate area as directed Daily.  Dispense: 9 mL; Refill: 11  -     glucose monitor monitoring kit; Use as directed to monitor blood glucose TID  Dispense: 1 each; Refill: 0  -     glucose blood test strip; Use as directed to monitor blood glucose TID  Dispense: 100 each; Refill: 5  -     Lancets misc; Use as directed to monitor blood glucose TID  Dispense: 150 each; Refill: 3    2. Bilateral foot pain  Comments:  Patient will try tying his shoes tighter. Refer to podiatry for eval.  Orders:  -     Ambulatory Referral to Podiatry        Return in about 3 months (around 12/4/2024) for Next scheduled follow up.    Portions of this note were completed with voice recognition program.  Electronically signed by Meenu Moore PA-C  [facility]  09/04/2024

## 2024-09-05 NOTE — ASSESSMENT & PLAN NOTE
Diabetes is improving with treatment.   Medication changes per orders.  Reminded to bring in blood sugar diary at next visit.  Recommended an ADA diet.  Discussed foot care.    Patient has not been taking medication regularly, but A1c is significantly improved. Will have him restart Lantus 30 units daily and stop Novolog. Retry jardiance 25 mg daily with new insurance.     Restart Wilbert sensor for closer monitoring to allow for insulin adjustments and prevent hypoglycemia.     Plan to check fasting labs at next visit, not fasting today.    Diabetes will be reassessed in 3 months

## 2024-09-10 ENCOUNTER — TELEPHONE (OUTPATIENT)
Dept: ENDOCRINOLOGY | Facility: CLINIC | Age: 59
End: 2024-09-10
Payer: COMMERCIAL

## 2024-09-10 NOTE — TELEPHONE ENCOUNTER
Patient is calling stating he picked up the [employer]estyle Wilbert 3 sensors from his pharmacy and neither of them worked.   He states his pharmacy ([facility]) said that they have been getting malfunctioning sensors.    Also he states he has been on the phone with the [employer] company and they are sending him new sensors but he would like to know what he should do until he gets them?

## 2024-12-04 ENCOUNTER — OFFICE VISIT (OUTPATIENT)
Dept: ENDOCRINOLOGY | Facility: CLINIC | Age: 59
End: 2024-12-04
Payer: COMMERCIAL

## 2024-12-04 VITALS
HEART RATE: 72 BPM | HEIGHT: 73 IN | BODY MASS INDEX: 24.07 KG/M2 | WEIGHT: 181.6 LBS | DIASTOLIC BLOOD PRESSURE: 68 MMHG | OXYGEN SATURATION: 97 % | SYSTOLIC BLOOD PRESSURE: 104 MMHG

## 2024-12-04 DIAGNOSIS — R63.4 WEIGHT LOSS: ICD-10-CM

## 2024-12-04 DIAGNOSIS — E11.9 TYPE 2 DIABETES MELLITUS WITHOUT COMPLICATION, WITH LONG-TERM CURRENT USE OF INSULIN: Primary | ICD-10-CM

## 2024-12-04 DIAGNOSIS — Z79.4 TYPE 2 DIABETES MELLITUS WITHOUT COMPLICATION, WITH LONG-TERM CURRENT USE OF INSULIN: Primary | ICD-10-CM

## 2024-12-04 LAB
EXPIRATION DATE: NORMAL
EXPIRATION DATE: NORMAL
GLUCOSE BLDC GLUCOMTR-MCNC: 120 MG/DL (ref 70–130)
HBA1C MFR BLD: 5.6 % (ref 4.5–5.7)
Lab: NORMAL
Lab: NORMAL

## 2024-12-04 NOTE — PATIENT INSTRUCTIONS
Check to make sure you are taking Jardiance 25 mg daily.  Reduce your Lantus dose to 30 units daily.  Schedule with new primary care provider.    Ester Gary,   [address] [facility] [address] [phone]  Can see anyone taking new patients in this office.

## 2024-12-04 NOTE — PROGRESS NOTES
Office Note      Date: 2024  Patient Name: Salvador Cortez  MRN: [id number]  : 1965    Chief Complaint   Patient presents with    Diabetes     Type II    Hyperlipidemia    Hypertension       History of Present Illness:   Salvador Cortez is a 59 y.o. male who presents for Diabetes type 2.   Diagnosed: 2019  Current RX: Lantus 40 units, Jardiance 25 mg daily    Bg checks are done: never  Hypoglycemia: none    Patient did not decrease Lantus to 30 units, has been taking 40 units since last visit.     He is not sure if he is taking Jardiance. Thinks he started it back but not sure if he is taking it now.    Notes that he has had weight loss over the past few months. Eating normally, no decrease in appetite.  Had respiratory illness recently. He needs to get another primary care provider. History of liver disease but believes this has resolved, saw GI    Did get Wilbert sensors after last visit but had some trouble with malfunctions. Was able to wear them for a little while.       Last A1c:  Hemoglobin A1C   Date Value Ref Range Status   2024 5.6 4.5 - 5.7 % Final       Changes in health since last visit: respiratory illness.     DM Health Maintenance:  Ophtho: 2024  Monofilament / Foot exam: 24   Lipids/Statin: not taking a statin with last FLP showing LDL 24  KARIE: 24   TSH: 24   Aspirin: not taking  ACE/ARB: no     Diabetic Complications:  Eyes: No  Kidneys: No  Feet: No  Heart: No      Subjective          Review of Systems:   Review of Systems   Constitutional:  Positive for unexpected weight change. Negative for activity change, appetite change and fatigue.   Respiratory:  Negative for chest tightness and shortness of breath.    Gastrointestinal:  Negative for abdominal pain, constipation, diarrhea and nausea.   Neurological:  Negative for dizziness and weakness.       The following portions of the patient's history were reviewed and updated as appropriate:  "allergies, current medications, past family history, past medical history, past social history, past surgical history, and problem list.    Objective     Visit Vitals  /68 (BP Location: Right arm, Patient Position: Sitting, Cuff Size: Adult)   Pulse 72   Ht 185.4 cm (72.99\")   Wt 82.4 kg (181 lb 9.6 oz)   SpO2 97%   BMI 23.96 kg/m²           Physical Exam:  Physical Exam  Constitutional:       Appearance: He is well-developed.   HENT:      Head: Normocephalic and atraumatic.      Right Ear: External ear normal.      Left Ear: External ear normal.   Eyes:      Conjunctiva/sclera: Conjunctivae normal.   Cardiovascular:      Rate and Rhythm: Normal rate and regular rhythm.   Pulmonary:      Effort: Pulmonary effort is normal.      Breath sounds: Normal breath sounds.   Musculoskeletal:         General: Normal range of motion.      Cervical back: Normal range of motion.   Skin:     General: Skin is warm and dry.   Psychiatric:         Behavior: Behavior normal.          Assessment / Plan      Assessment & Plan:  Diagnoses and all orders for this visit:    1. Type 2 diabetes mellitus without complication, with long-term current use of insulin (Primary)  Assessment & Plan:  Diabetes is improving with treatment.   Medication changes per orders.  Reminded to bring in blood sugar diary at next visit.  Recommended an ADA diet.  Reminded to get yearly retinal exam.    A1c continues to improve and is down to 5.6.  Due to concern for hypoglycemia, will have patient decrease Lantus to 30 units daily.    He is unsure if he has been taking the Jardiance and will confirm this when he gets home. If he has been taking it, he can continue Jardiance 25 mg daily.    Reminded patient to check blood sugar at least once daily when fasting, which will allow us to make sure glucose levels are not getting too low.    Diabetes will be reassessed in 3 months    Orders:  -     POC Glucose, Blood  -     POC Glycosylated Hemoglobin (Hb " A1C)  -     Microalbumin / Creatinine Urine Ratio - Urine, Clean Catch; Future  -     Comprehensive Metabolic Panel; Future    2. Weight loss  Comments:  Will check some screening labs. Gave patient contact information for [facility] PCP to schedule appointment for further eval.  Orders:  -     TSH Rfx On Abnormal To Free T4; Future  -     CBC & Differential; Future        Return in about 3 months (around 3/4/2025) for Follow up with Dr. Cruz; appointment on 12/18 can be cancelled.    Portions of this note were completed with voice recognition program.  Electronically signed by Meenu Moore PA-C  [facility]  12/04/2024

## 2024-12-05 ENCOUNTER — LAB (OUTPATIENT)
Dept: ENDOCRINOLOGY | Facility: CLINIC | Age: 59
End: 2024-12-05
Payer: COMMERCIAL

## 2024-12-05 DIAGNOSIS — Z79.4 TYPE 2 DIABETES MELLITUS WITHOUT COMPLICATION, WITH LONG-TERM CURRENT USE OF INSULIN: ICD-10-CM

## 2024-12-05 DIAGNOSIS — R63.4 WEIGHT LOSS: ICD-10-CM

## 2024-12-05 DIAGNOSIS — E11.9 TYPE 2 DIABETES MELLITUS WITHOUT COMPLICATION, WITH LONG-TERM CURRENT USE OF INSULIN: ICD-10-CM

## 2024-12-05 PROCEDURE — 80050 GENERAL HEALTH PANEL: CPT | Performed by: PHYSICIAN ASSISTANT

## 2024-12-05 PROCEDURE — 82043 UR ALBUMIN QUANTITATIVE: CPT | Performed by: PHYSICIAN ASSISTANT

## 2024-12-05 PROCEDURE — 36415 COLL VENOUS BLD VENIPUNCTURE: CPT | Performed by: PHYSICIAN ASSISTANT

## 2024-12-05 PROCEDURE — 82570 ASSAY OF URINE CREATININE: CPT | Performed by: PHYSICIAN ASSISTANT

## 2024-12-05 NOTE — ASSESSMENT & PLAN NOTE
Diabetes is improving with treatment.   Medication changes per orders.  Reminded to bring in blood sugar diary at next visit.  Recommended an ADA diet.  Reminded to get yearly retinal exam.    A1c continues to improve and is down to 5.6.  Due to concern for hypoglycemia, will have patient decrease Lantus to 30 units daily.    He is unsure if he has been taking the Jardiance and will confirm this when he gets home. If he has been taking it, he can continue Jardiance 25 mg daily.    Reminded patient to check blood sugar at least once daily when fasting, which will allow us to make sure glucose levels are not getting too low.    Diabetes will be reassessed in 3 months

## 2024-12-06 LAB
ALBUMIN SERPL-MCNC: 4 G/DL (ref 3.5–5.2)
ALBUMIN UR-MCNC: <1.2 MG/DL
ALBUMIN/GLOB SERPL: 1.3 G/DL
ALP SERPL-CCNC: 72 U/L (ref 39–117)
ALT SERPL W P-5'-P-CCNC: 7 U/L (ref 1–41)
ANION GAP SERPL CALCULATED.3IONS-SCNC: 10.7 MMOL/L (ref 5–15)
AST SERPL-CCNC: 14 U/L (ref 1–40)
BASOPHILS # BLD AUTO: 0.02 10*3/MM3 (ref 0–0.2)
BASOPHILS NFR BLD AUTO: 0.2 % (ref 0–1.5)
BILIRUB SERPL-MCNC: 0.5 MG/DL (ref 0–1.2)
BUN SERPL-MCNC: 21 MG/DL (ref 6–20)
BUN/CREAT SERPL: 13.3 (ref 7–25)
CALCIUM SPEC-SCNC: 9.4 MG/DL (ref 8.6–10.5)
CHLORIDE SERPL-SCNC: 102 MMOL/L (ref 98–107)
CO2 SERPL-SCNC: 25.3 MMOL/L (ref 22–29)
CREAT SERPL-MCNC: 1.58 MG/DL (ref 0.76–1.27)
CREAT UR-MCNC: 102.1 MG/DL
DEPRECATED RDW RBC AUTO: 35.9 FL (ref 37–54)
EGFRCR SERPLBLD CKD-EPI 2021: 50.1 ML/MIN/1.73
EOSINOPHIL # BLD AUTO: 0.21 10*3/MM3 (ref 0–0.4)
EOSINOPHIL NFR BLD AUTO: 2.6 % (ref 0.3–6.2)
ERYTHROCYTE [DISTWIDTH] IN BLOOD BY AUTOMATED COUNT: 10.7 % (ref 12.3–15.4)
GLOBULIN UR ELPH-MCNC: 3.2 GM/DL
GLUCOSE SERPL-MCNC: 96 MG/DL (ref 65–99)
HCT VFR BLD AUTO: 43.1 % (ref 37.5–51)
HGB BLD-MCNC: 14.6 G/DL (ref 13–17.7)
IMM GRANULOCYTES # BLD AUTO: 0.02 10*3/MM3 (ref 0–0.05)
IMM GRANULOCYTES NFR BLD AUTO: 0.2 % (ref 0–0.5)
LYMPHOCYTES # BLD AUTO: 2.15 10*3/MM3 (ref 0.7–3.1)
LYMPHOCYTES NFR BLD AUTO: 26.3 % (ref 19.6–45.3)
MCH RBC QN AUTO: 30.9 PG (ref 26.6–33)
MCHC RBC AUTO-ENTMCNC: 33.9 G/DL (ref 31.5–35.7)
MCV RBC AUTO: 91.1 FL (ref 79–97)
MICROALBUMIN/CREAT UR: NORMAL MG/G{CREAT}
MONOCYTES # BLD AUTO: 0.39 10*3/MM3 (ref 0.1–0.9)
MONOCYTES NFR BLD AUTO: 4.8 % (ref 5–12)
NEUTROPHILS NFR BLD AUTO: 5.39 10*3/MM3 (ref 1.7–7)
NEUTROPHILS NFR BLD AUTO: 65.9 % (ref 42.7–76)
PLATELET # BLD AUTO: 147 10*3/MM3 (ref 140–450)
PMV BLD AUTO: 13.2 FL (ref 6–12)
POTASSIUM SERPL-SCNC: 3.6 MMOL/L (ref 3.5–5.2)
PROT SERPL-MCNC: 7.2 G/DL (ref 6–8.5)
RBC # BLD AUTO: 4.73 10*6/MM3 (ref 4.14–5.8)
SODIUM SERPL-SCNC: 138 MMOL/L (ref 136–145)
TSH SERPL DL<=0.05 MIU/L-ACNC: 0.66 UIU/ML (ref 0.27–4.2)
WBC NRBC COR # BLD AUTO: 8.18 10*3/MM3 (ref 3.4–10.8)

## 2024-12-10 ENCOUNTER — OFFICE VISIT (OUTPATIENT)
Dept: INTERNAL MEDICINE | Facility: CLINIC | Age: 59
End: 2024-12-10
Payer: COMMERCIAL

## 2024-12-10 VITALS
DIASTOLIC BLOOD PRESSURE: 68 MMHG | WEIGHT: 184.8 LBS | OXYGEN SATURATION: 98 % | BODY MASS INDEX: 24.49 KG/M2 | SYSTOLIC BLOOD PRESSURE: 118 MMHG | HEART RATE: 60 BPM | HEIGHT: 73 IN | TEMPERATURE: 98 F

## 2024-12-10 DIAGNOSIS — I10 PRIMARY HYPERTENSION: Chronic | ICD-10-CM

## 2024-12-10 DIAGNOSIS — N18.31 CKD STAGE 3A, GFR 45-59 ML/MIN: ICD-10-CM

## 2024-12-10 DIAGNOSIS — E11.21 DIABETIC NEPHROPATHY ASSOCIATED WITH TYPE 2 DIABETES MELLITUS: Primary | Chronic | ICD-10-CM

## 2024-12-10 DIAGNOSIS — E78.2 MIXED HYPERLIPIDEMIA: Chronic | ICD-10-CM

## 2024-12-10 DIAGNOSIS — Z72.0 TOBACCO USE: ICD-10-CM

## 2024-12-10 PROBLEM — Z96.642 STATUS POST TOTAL HIP REPLACEMENT, LEFT: Status: RESOLVED | Noted: 2021-12-13 | Resolved: 2024-12-10

## 2024-12-10 PROBLEM — I87.2 VENOUS INSUFFICIENCY OF LEFT LOWER EXTREMITY: Status: RESOLVED | Noted: 2024-02-21 | Resolved: 2024-12-10

## 2024-12-10 PROBLEM — E11.9 DM (DIABETES MELLITUS): Chronic | Status: RESOLVED | Noted: 2018-05-21 | Resolved: 2024-12-10

## 2024-12-10 PROBLEM — K85.00 IDIOPATHIC ACUTE PANCREATITIS: Status: RESOLVED | Noted: 2023-11-13 | Resolved: 2024-12-10

## 2024-12-10 RX ORDER — NICOTINE 21 MG/24HR
1 PATCH, TRANSDERMAL 24 HOURS TRANSDERMAL EVERY 24 HOURS
Qty: 30 PATCH | Refills: 1 | Status: SHIPPED | OUTPATIENT
Start: 2024-12-10 | End: 2025-02-08

## 2024-12-10 RX ORDER — POLYETHYLENE GLYCOL 3350 17 G
2 POWDER IN PACKET (EA) ORAL AS NEEDED
Qty: 378 LOZENGE | Refills: 2 | Status: SHIPPED | OUTPATIENT
Start: 2024-12-10 | End: 2025-01-26

## 2024-12-10 NOTE — PROGRESS NOTES
New Patient Office Visit      Date: 12/10/2024      Patient Name: Salvador Cortez  : 1965   MRN: [id number]   Care Team: Patient Care Team:  Joseph Solorzano MD as PCP - General (Internal Medicine)  Gianni Cruz MD as Consulting Physician (Endocrinology)     Chief Complaint:    Chief Complaint   Patient presents with    Establish Care       History of Present Illness: Salvador Cortez is a 59 y.o. male who presents to clinic today to establish care.  Endorses intermittent sharp pain along the left leg, not described as tingling or numbness. No trauma.    Past Medical History  -History of secondary Syphilis  -Hypertension  -Type 2 DM complicated by neuropathy  -Hyperlipidemia  -CKD stage 3a  -Left total hip arthroplasty    Past Surgical History  - 2 Left total hip arthroplasty    Family History  - hypertension; dad  - diabetes: dad  - mother: CVA ( )  - no family history of cancers    Social History  - smokes 2 packs of cigarette per week, resumed about a year after abstaining for about 23 years ( he states that he unable to recall details of past smoking history 23 years ago) , marijuana use/ alcohol shots on the weekends, no other illicit drug use      Allergies  - NKDA    Medications.  - Jardiance 25 mg q daily  - nicotine replacement therapy  - furosemide  [facility]    Health maintenance  - colonoscopy : 24  polyp in the cecum, transverse colon and sigmoid colon ( unsure of repeat date for surveillance per GI)        Subjective     Review of System: Review of Systems   Constitutional:  Negative for chills, fatigue and fever.   Eyes:  Negative for visual disturbance.   Respiratory:  Negative for cough and shortness of breath.    Cardiovascular:  Negative for chest pain, palpitations and leg swelling.   Gastrointestinal:  Negative for abdominal pain, blood in stool, nausea and vomiting.   Genitourinary:  Negative for penile discharge and scrotal swelling.   Musculoskeletal:   Negative for arthralgias and myalgias.   Skin:  Negative for rash.   Neurological:  Negative for tremors, seizures and headaches.   Hematological:  Negative for adenopathy.   Psychiatric/Behavioral:  The patient is not nervous/anxious.         Past Medical History:   Past Medical History:   Diagnosis Date    Hypertension     Tobacco use     Cessation encouraged    Type 2 diabetes mellitus        Past Surgical History: History reviewed. No pertinent surgical history.    Family History:   Family History   Problem Relation Age of Onset    Diabetes Father     Diabetes Child        Social History:   Social History     Socioeconomic History    Marital status:    Tobacco Use    Smoking status: Some Days     Current packs/day: 1.50     Average packs/day: 1.5 packs/day for 1.9 years (2.9 ttl pk-yrs)     Types: Cigarettes     Start date: 2023     Passive exposure: Current    Smokeless tobacco: Never   Vaping Use    Vaping status: Never Used   Substance and Sexual Activity    Alcohol use: Yes     Comment: Occassionally    Drug use: Yes     Frequency: 7.0 times per week     Types: Marijuana    Sexual activity: Yes     Partners: Female       Medications:     Current Outpatient Medications:     BD Pen Needle Micro U/F 32G X 6 MM misc, , Disp: , Rfl:     cholestyramine (QUESTRAN) 4 GM/DOSE powder, Take 1 packet by mouth 2 (Two) Times a Day With Meals. mixed with a liquid, Disp: 378 g, Rfl: 3    clobetasol (TEMOVATE) 0.05 % ointment, Apply 1 Application topically to the appropriate area as directed Every Night., Disp: 60 g, Rfl: 0    Continuous Glucose Sensor (FreeStyle Wilbert 3 Sensor) misc, Use 1 each Every 14 (Fourteen) Days., Disp: 6 each, Rfl: 3    doxycycline (MONODOX) 100 MG capsule, Take 1 capsule by mouth 2 (Two) Times a Day., Disp: 28 capsule, Rfl: 0    empagliflozin (JARDIANCE) 25 MG tablet tablet, Take 1 tablet by mouth Daily., Disp: 90 tablet, Rfl: 3    furosemide (LASIX) 40 MG tablet, Take 1 tablet by mouth  "Daily., Disp: 5 tablet, Rfl: 0    glucose blood test strip, Use as directed to monitor blood glucose TID, Disp: 100 each, Rfl: 5    glucose monitor monitoring kit, Use as directed to monitor blood glucose TID, Disp: 1 each, Rfl: 0    hydrOXYzine pamoate (Vistaril) 25 MG capsule, Take 1 capsule by mouth 3 (Three) Times a Day As Needed for Itching., Disp: 20 capsule, Rfl: 0     MG tablet, , Disp: , Rfl:     Lancets misc, Use as directed to monitor blood glucose TID, Disp: 150 each, Rfl: 3    Lantus SoloStar 100 UNIT/ML injection pen, Inject 30 Units under the skin into the appropriate area as directed Daily., Disp: 9 mL, Rfl: 11    potassium chloride (KLOR-CON M20) 20 MEQ CR tablet, Take 1 tablet by mouth Daily., Disp: 5 tablet, Rfl: 0    ursodiol (ACTIGALL) 300 MG capsule, Take 1 capsule by mouth 3 (Three) Times a Day., Disp: 180 capsule, Rfl: 3    nicotine (NICODERM CQ) 14 MG/24HR patch, Place 1 patch on the skin as directed by provider Daily for 60 days., Disp: 30 patch, Rfl: 1    nicotine polacrilex (Nicotine Mini) 2 MG lozenge, Dissolve 1 lozenge in the mouth As Needed for Smoking Cessation (9 lozenges a day) for up to 47 days., Disp: 378 lozenge, Rfl: 2    Allergies:   No Known Allergies    Objective     Physical Exam:   Vital Signs:   Vitals:    12/10/24 1042   BP: 118/68   BP Location: Left arm   Patient Position: Sitting   Pulse: 60   Temp: 98 °F (36.7 °C)   TempSrc: Temporal   SpO2: 98%   Weight: 83.8 kg (184 lb 12.8 oz)   Height: 185.4 cm (72.99\")     Body mass index is 24.39 kg/m².     Physical Exam  Vitals and nursing note reviewed.   Constitutional:       Appearance: Normal appearance.   HENT:      Head: Normocephalic and atraumatic.      Mouth/Throat:      Mouth: Mucous membranes are moist.      Pharynx: Oropharynx is clear.   Eyes:      Extraocular Movements: Extraocular movements intact.      Pupils: Pupils are equal, round, and reactive to light.   Neck:      Thyroid: No thyroid mass, " thyromegaly or thyroid tenderness.   Cardiovascular:      Rate and Rhythm: Normal rate and regular rhythm.      Pulses: Normal pulses.      Heart sounds: Normal heart sounds.   Pulmonary:      Effort: Pulmonary effort is normal.      Breath sounds: Normal breath sounds.   Abdominal:      General: Abdomen is flat. Bowel sounds are normal.      Palpations: Abdomen is soft.   Musculoskeletal:         General: Normal range of motion.      Cervical back: Normal range of motion.   Lymphadenopathy:      Cervical: No cervical adenopathy.   Skin:     General: Skin is warm.   Neurological:      Mental Status: He is alert and oriented to person, place, and time.   Psychiatric:         Mood and Affect: Mood normal.         Behavior: Behavior normal.         Procedures    Results for orders placed or performed in visit on 12/05/24   Microalbumin / Creatinine Urine Ratio - Urine, Clean Catch    Collection Time: 12/05/24 11:14 AM    Specimen: Urine, Clean Catch   Result Value Ref Range    Microalbumin/Creatinine Ratio      Creatinine, Urine 102.1 mg/dL    Microalbumin, Urine <1.2 mg/dL   Comprehensive Metabolic Panel    Collection Time: 12/05/24 11:14 AM    Specimen: Arm, Left; Blood   Result Value Ref Range    Glucose 96 65 - 99 mg/dL    BUN 21 (H) 6 - 20 mg/dL    Creatinine 1.58 (H) 0.76 - 1.27 mg/dL    Sodium 138 136 - 145 mmol/L    Potassium 3.6 3.5 - 5.2 mmol/L    Chloride 102 98 - 107 mmol/L    CO2 25.3 22.0 - 29.0 mmol/L    Calcium 9.4 8.6 - 10.5 mg/dL    Total Protein 7.2 6.0 - 8.5 g/dL    Albumin 4.0 3.5 - 5.2 g/dL    ALT (SGPT) 7 1 - 41 U/L    AST (SGOT) 14 1 - 40 U/L    Alkaline Phosphatase 72 39 - 117 U/L    Total Bilirubin 0.5 0.0 - 1.2 mg/dL    Globulin 3.2 gm/dL    A/G Ratio 1.3 g/dL    BUN/Creatinine Ratio 13.3 7.0 - 25.0    Anion Gap 10.7 5.0 - 15.0 mmol/L    eGFR 50.1 (L) >60.0 mL/min/1.73   TSH Rfx On Abnormal To Free T4    Collection Time: 12/05/24 11:14 AM    Specimen: Arm, Left; Blood   Result Value Ref Range     TSH 0.658 0.270 - 4.200 uIU/mL   CBC Auto Differential    Collection Time: 12/05/24 11:14 AM    Specimen: Blood   Result Value Ref Range    WBC 8.18 3.40 - 10.80 10*3/mm3    RBC 4.73 4.14 - 5.80 10*6/mm3    Hemoglobin 14.6 13.0 - 17.7 g/dL    Hematocrit 43.1 37.5 - 51.0 %    MCV 91.1 79.0 - 97.0 fL    MCH 30.9 26.6 - 33.0 pg    MCHC 33.9 31.5 - 35.7 g/dL    RDW 10.7 (L) 12.3 - 15.4 %    RDW-SD 35.9 (L) 37.0 - 54.0 fl    MPV 13.2 (H) 6.0 - 12.0 fL    Platelets 147 140 - 450 10*3/mm3    Neutrophil % 65.9 42.7 - 76.0 %    Lymphocyte % 26.3 19.6 - 45.3 %    Monocyte % 4.8 (L) 5.0 - 12.0 %    Eosinophil % 2.6 0.3 - 6.2 %    Basophil % 0.2 0.0 - 1.5 %    Immature Grans % 0.2 0.0 - 0.5 %    Neutrophils, Absolute 5.39 1.70 - 7.00 10*3/mm3    Lymphocytes, Absolute 2.15 0.70 - 3.10 10*3/mm3    Monocytes, Absolute 0.39 0.10 - 0.90 10*3/mm3    Eosinophils, Absolute 0.21 0.00 - 0.40 10*3/mm3    Basophils, Absolute 0.02 0.00 - 0.20 10*3/mm3    Immature Grans, Absolute 0.02 0.00 - 0.05 10*3/mm3          Assessment / Plan      Assessment/Plan:   Diagnoses and all orders for this visit:    1. Diabetic nephropathy associated with type 2 diabetes mellitus (Primary)  -     Ambulatory Referral for Diabetic Eye Exam-Optometry  - most recent A1c 5.6 from 12/4    2. Primary hypertension  - not currently on meds  - advised to log home blood pressures and present at next appt to exclude masked hypertension    3. Mixed hyperlipidemia  - not on statin per medication review.  - most recent ldl 101 from 5/20/24, seem like patient was on statin in recent past  - will repeat lipid profile at next visit and calculate 10 year ascvd risk    4. CKD stage 3a, GFR 45-59 ml/min  - likely diabetic nephropathy although no proteinuria, also hypertension related nephropathy not excluded    5. Tobacco use:   - patient was vague regarding smoking history and so unable to determine if he qualifies for LDCT scanning ( might consider screening if he is unable  to quit), starting nicotine replacement therapy as he is motivated to quit.   -     nicotine (NICODERM CQ) 14 MG/24HR patch; Place 1 patch on the skin as directed by provider Daily for 60 days.  Dispense: 30 patch; Refill: 1  -     nicotine polacrilex (Nicotine Mini) 2 MG lozenge; Dissolve 1 lozenge in the mouth As Needed for Smoking Cessation (9 lozenges a day) for up to 47 days.  Dispense: 378 lozenge; Refill: 2         Follow Up:   Return in about 8 weeks (around 2/4/2025) for Recheck.    Time:   I spent approximately 45 minutes providing clinical care for this patient; including review of patient's chart and provider documentation, face to face time spent with patient in examination room (obtaining history, performing physical exam, discussing diagnosis and management options), placing orders, and completing patient documentation.     Joseph Solorzano MD  [facility]

## 2024-12-13 ENCOUNTER — PATIENT ROUNDING (BHMG ONLY) (OUTPATIENT)
Dept: INTERNAL MEDICINE | Facility: CLINIC | Age: 59
End: 2024-12-13
Payer: COMMERCIAL

## 2024-12-13 NOTE — PROGRESS NOTES
A My-chart message has been sent to the patient for Patient Rounding with [facility] [place].

## 2024-12-16 NOTE — PROGRESS NOTES
Sorry for the delayed response to your labs.    Your labs show that your kidney function was slightly declined. This may have been from fasting for the blood draw. Make sure you are drinking plenty of water and avoid NSAIDs (ibuprofen, diclofenac, mobic, etc).    Your thyroid function, blood counts, liver function, electrolytes and urine protein levels were in normal range.    Let me know if you have any questions or concerns about these results.    NAREN Menon

## 2024-12-30 ENCOUNTER — TELEPHONE (OUTPATIENT)
Dept: ENDOCRINOLOGY | Facility: CLINIC | Age: 59
End: 2024-12-30

## 2025-01-07 ENCOUNTER — OFFICE VISIT (OUTPATIENT)
Dept: ENDOCRINOLOGY | Facility: CLINIC | Age: 60
End: 2025-01-07
Payer: COMMERCIAL

## 2025-01-07 VITALS
DIASTOLIC BLOOD PRESSURE: 78 MMHG | HEART RATE: 63 BPM | HEIGHT: 73 IN | OXYGEN SATURATION: 98 % | SYSTOLIC BLOOD PRESSURE: 122 MMHG | BODY MASS INDEX: 23.99 KG/M2 | WEIGHT: 181 LBS

## 2025-01-07 DIAGNOSIS — E11.21 TYPE 2 DIABETES MELLITUS WITH DIABETIC NEPHROPATHY, WITH LONG-TERM CURRENT USE OF INSULIN: Primary | ICD-10-CM

## 2025-01-07 DIAGNOSIS — Z79.4 TYPE 2 DIABETES MELLITUS WITH DIABETIC NEPHROPATHY, WITH LONG-TERM CURRENT USE OF INSULIN: Primary | ICD-10-CM

## 2025-01-07 PROBLEM — E11.9 TYPE 2 DIABETES MELLITUS: Status: RESOLVED | Noted: 2018-05-21 | Resolved: 2025-01-07

## 2025-01-07 PROBLEM — E11.9 TYPE 2 DIABETES MELLITUS: Status: ACTIVE | Noted: 2018-05-21

## 2025-01-07 PROBLEM — E11.9 TYPE 2 DIABETES MELLITUS: Status: ACTIVE | Noted: 2023-11-13

## 2025-01-07 PROBLEM — E11.29 TYPE 2 DIABETES MELLITUS WITH KIDNEY COMPLICATION, WITH LONG-TERM CURRENT USE OF INSULIN: Status: ACTIVE | Noted: 2023-11-13

## 2025-01-07 LAB
EXPIRATION DATE: ABNORMAL
GLUCOSE BLDC GLUCOMTR-MCNC: 154 MG/DL (ref 70–130)
Lab: ABNORMAL

## 2025-01-07 PROCEDURE — 99213 OFFICE O/P EST LOW 20 MIN: CPT | Performed by: PHYSICIAN ASSISTANT

## 2025-01-07 PROCEDURE — 95251 CONT GLUC MNTR ANALYSIS I&R: CPT | Performed by: PHYSICIAN ASSISTANT

## 2025-01-07 NOTE — PATIENT INSTRUCTIONS
Come back to our office on a Thursday or Friday from 9-3 for walk in labs. Eat and drink normally, take medications and make sure you have had water to drink before you come in for labs.

## 2025-01-07 NOTE — ASSESSMENT & PLAN NOTE
Diabetes is stable.   Continue current treatment regimen.  Reminded to bring in blood sugar diary at next visit.  Recommended an ADA diet.  Discussed ways to avoid symptomatic hypoglycemia.    Reviewed labs with patient that showed declining kidney function. Will have him return when well hydrated to recheck lab results. He has also restarted jardiance and will want to make sure this is not negatively impacting kidney function.    Patient will restart Wilbert and if having frequent hypoglycemia alerts, can decrease Lantus further, down to 27 units daily.    Diabetes will be reassessed in 3 months

## 2025-01-07 NOTE — PROGRESS NOTES
Office Note      Date: 2025  Patient Name: Salvador Cortez  MRN: [id number]  : 1965    Chief Complaint   Patient presents with    Diabetes     Type II.       History of Present Illness:   Salvador Cortez is a 59 y.o. male who presents for Diabetes type 2.   Diagnosed:   Current RX:  Lantus 30 units, Jardiance 25 mg daily     Bg checks are done: continuously when wearing Wilbert  Hypoglycemia : occasionally when he was wearing Wilbert    Patient confirmed that he has been taking Jardiance daily. He reduced the Lantus to 30 units daily. Has been wearing Wilbert until recently.     Diagnosed with Flu A last week. He has not been feeling well.     Scheduled this appointment to discuss labs that showed declining kidney function. He thought he was supposed to come in for this.    Last A1c:  Hemoglobin A1C   Date Value Ref Range Status   2024 5.6 4.5 - 5.7 % Final     The CGM data from 12/10/24-24 are reviewed.  1 % are low  93 % are in range  6 % are 180-250  0 % are >250  GMI: 6.3  The glycemic pattern shows: Glucose averages are well-controlled in target range      DM Health Maintenance:  Ophtho: 2024  Monofilament / Foot exam: 24   Lipids/Statin: not taking a statin with last FLP showing LDL 24  KARIE: 24   TSH: 24   Aspirin: not taking  ACE/ARB: no     Diabetic Complications:  Eyes: No  Kidneys: No  Feet: No  Heart: No      Subjective          Review of Systems:   Review of Systems   Constitutional:  Positive for fatigue.   HENT:  Positive for congestion.    Respiratory:  Positive for cough.    Gastrointestinal:  Negative for nausea.       The following portions of the patient's history were reviewed and updated as appropriate: allergies, current medications, past family history, past medical history, past social history, past surgical history, and problem list.    Objective     Visit Vitals  /78 (BP Location: Left arm, Patient Position: Sitting, Cuff Size:  "Adult)   Pulse 63   Ht 185.4 cm (73\")   Wt 82.1 kg (181 lb)   SpO2 98%   BMI 23.88 kg/m²           Physical Exam:  Physical Exam  Constitutional:       Appearance: He is well-developed.   HENT:      Head: Normocephalic and atraumatic.      Right Ear: External ear normal.      Left Ear: External ear normal.   Eyes:      Conjunctiva/sclera: Conjunctivae normal.   Cardiovascular:      Rate and Rhythm: Normal rate.   Pulmonary:      Effort: Pulmonary effort is normal.   Musculoskeletal:         General: Normal range of motion.      Cervical back: Normal range of motion.   Skin:     General: Skin is warm and dry.   Psychiatric:         Behavior: Behavior normal.          Assessment / Plan      Assessment & Plan:  Diagnoses and all orders for this visit:    1. Type 2 diabetes mellitus with diabetic nephropathy, with long-term current use of insulin (Primary)  Assessment & Plan:  Diabetes is stable.   Continue current treatment regimen.  Reminded to bring in blood sugar diary at next visit.  Recommended an ADA diet.  Discussed ways to avoid symptomatic hypoglycemia.    Reviewed labs with patient that showed declining kidney function. Will have him return when well hydrated to recheck lab results. He has also restarted jardiance and will want to make sure this is not negatively impacting kidney function.    Patient will restart Wilbert and if having frequent hypoglycemia alerts, can decrease Lantus further, down to 27 units daily.    Diabetes will be reassessed in 3 months    Orders:  -     POC Glucose, Blood  -     Comprehensive Metabolic Panel; Future        Return for Next scheduled follow up.    Portions of this note were completed with voice recognition program.  Electronically signed by Meenu Moore PA-C  [facility] [place]  01/07/2025  "

## 2025-01-09 ENCOUNTER — LAB (OUTPATIENT)
Dept: ENDOCRINOLOGY | Facility: CLINIC | Age: 60
End: 2025-01-09
Payer: COMMERCIAL

## 2025-01-09 DIAGNOSIS — Z79.4 TYPE 2 DIABETES MELLITUS WITH DIABETIC NEPHROPATHY, WITH LONG-TERM CURRENT USE OF INSULIN: ICD-10-CM

## 2025-01-09 DIAGNOSIS — E11.21 TYPE 2 DIABETES MELLITUS WITH DIABETIC NEPHROPATHY, WITH LONG-TERM CURRENT USE OF INSULIN: ICD-10-CM

## 2025-01-09 LAB
ALBUMIN SERPL-MCNC: 3.8 G/DL (ref 3.5–5.2)
ALBUMIN/GLOB SERPL: 1.2 G/DL
ALP SERPL-CCNC: 76 U/L (ref 39–117)
ALT SERPL W P-5'-P-CCNC: 10 U/L (ref 1–41)
ANION GAP SERPL CALCULATED.3IONS-SCNC: 11 MMOL/L (ref 5–15)
AST SERPL-CCNC: 19 U/L (ref 1–40)
BILIRUB SERPL-MCNC: 0.7 MG/DL (ref 0–1.2)
BUN SERPL-MCNC: 21 MG/DL (ref 6–20)
BUN/CREAT SERPL: 13.7 (ref 7–25)
CALCIUM SPEC-SCNC: 8.8 MG/DL (ref 8.6–10.5)
CHLORIDE SERPL-SCNC: 100 MMOL/L (ref 98–107)
CO2 SERPL-SCNC: 26 MMOL/L (ref 22–29)
CREAT SERPL-MCNC: 1.53 MG/DL (ref 0.76–1.27)
EGFRCR SERPLBLD CKD-EPI 2021: 52 ML/MIN/1.73
GLOBULIN UR ELPH-MCNC: 3.2 GM/DL
GLUCOSE SERPL-MCNC: 154 MG/DL (ref 65–99)
POTASSIUM SERPL-SCNC: 4.2 MMOL/L (ref 3.5–5.2)
PROT SERPL-MCNC: 7 G/DL (ref 6–8.5)
SODIUM SERPL-SCNC: 137 MMOL/L (ref 136–145)

## 2025-01-09 PROCEDURE — 80053 COMPREHEN METABOLIC PANEL: CPT | Performed by: PHYSICIAN ASSISTANT

## 2025-01-09 PROCEDURE — 36415 COLL VENOUS BLD VENIPUNCTURE: CPT | Performed by: PHYSICIAN ASSISTANT

## 2025-01-16 ENCOUNTER — TELEPHONE (OUTPATIENT)
Dept: ENDOCRINOLOGY | Facility: CLINIC | Age: 60
End: 2025-01-16
Payer: COMMERCIAL

## 2025-01-16 RX ORDER — ACYCLOVIR 400 MG/1
1 TABLET ORAL
Qty: 9 EACH | Refills: 1 | Status: SHIPPED | OUTPATIENT
Start: 2025-01-16

## 2025-01-17 NOTE — TELEPHONE ENCOUNTER
- Pt called on-call provider regarding freestyle jermaine 3 sensor stating he had low glucoses readings in the 60's. However, pt was asymptomatic at the time. I had patient check a FSBG which was 173. It appears that the sensor was not correct.  Pt also reported the jermaine sensor was also losing connectivity a lot. I  - Did not change Pt's insulin dose.   - Instructed pt to always check a FSBG if the sensor is alerting for low BG, especially if asymptomatic. Pt also educated on the 15/15 rules on how to treat for hypoglycemia. Have sent a Rx for Dexcom G7 sensors as this appears to the preferred sensor for his insurance. Will notify his Endo provider as he will need training on how to setup and start this new sensor system.   Electronically Signed: Cheri Bolaños MD

## 2025-01-27 ENCOUNTER — DOCUMENTATION (OUTPATIENT)
Dept: ENDOCRINOLOGY | Facility: CLINIC | Age: 60
End: 2025-01-27
Payer: COMMERCIAL

## 2025-01-27 ENCOUNTER — TELEPHONE (OUTPATIENT)
Dept: ENDOCRINOLOGY | Facility: CLINIC | Age: 60
End: 2025-01-27
Payer: COMMERCIAL

## 2025-01-27 DIAGNOSIS — L29.9 ITCHING: Primary | ICD-10-CM

## 2025-01-27 RX ORDER — DOXYCYCLINE 100 MG/1
100 CAPSULE ORAL 2 TIMES DAILY
Qty: 28 CAPSULE | Refills: 0 | OUTPATIENT
Start: 2025-01-27

## 2025-01-27 NOTE — TELEPHONE ENCOUNTER
Caller: Salvador Cortez TRENT    Relationship: Self    Best call back number: [phone]     Requested Prescriptions:   Requested Prescriptions     Pending Prescriptions Disp Refills    doxycycline (MONODOX) 100 MG capsule 28 capsule 0     Sig: Take 1 capsule by mouth 2 (Two) Times a Day.        Pharmacy where request should be sent: [facility] [id number] [phone] [facility] [phone]      Last office visit with prescribing clinician: 12/10/2024   Last telemedicine visit with prescribing clinician: Visit date not found   Next office visit with prescribing clinician: 2/10/2025     Additional details provided by patient: PATIENT STATED THAT HE IS ITCHING AGAIN AND THAT HE DOES NOT KNOW THE NAME OF THE MEDICATION THAT HE WAS PRESCRIBED.    Does the patient have less than a 3 day supply:  [x] Yes  [] No    Would you like a call back once the refill request has been completed: [] Yes [] No    If the office needs to give you a call back, can they leave a voicemail: [] Yes [] No    Jess Dubon Rep   01/27/25 11:30 EST

## 2025-01-27 NOTE — TELEPHONE ENCOUNTER
PT CAME IN TO THE OFFICE FOR SOEMTHGIN ELSE. HE REQUESTED RX FOR SILDENAFIL 100MG TO BE SENT IN TO [facility] ON [address].

## 2025-01-27 NOTE — PROGRESS NOTES
Pt came by office for help with Dexcom, attempted to place G7 last night and had difficulty pairing. Attempted again and received senor failed message. Provided pt with replacement and assisted with starting new sensor  - no issues with replacement.

## 2025-01-27 NOTE — TELEPHONE ENCOUNTER
C/o itching.  Wants to check labs to see if the problem is back.  He had pills to help with this but can't find the pill bottle.  Pt advised you are out of the office this afternoon.

## 2025-01-27 NOTE — TELEPHONE ENCOUNTER
Pt called stating he needs to get some lab work and wanted to talk to Meenu before the order is put in     Please call him back  [phone]

## 2025-01-30 RX ORDER — HYDROXYZINE PAMOATE 25 MG/1
25 CAPSULE ORAL 3 TIMES DAILY PRN
Qty: 60 CAPSULE | Refills: 0 | Status: SHIPPED | OUTPATIENT
Start: 2025-01-30

## 2025-01-31 ENCOUNTER — LAB (OUTPATIENT)
Dept: LAB | Facility: HOSPITAL | Age: 60
End: 2025-01-31
Payer: COMMERCIAL

## 2025-01-31 ENCOUNTER — OFFICE VISIT (OUTPATIENT)
Dept: INTERNAL MEDICINE | Facility: CLINIC | Age: 60
End: 2025-01-31
Payer: COMMERCIAL

## 2025-01-31 VITALS
HEIGHT: 73 IN | DIASTOLIC BLOOD PRESSURE: 76 MMHG | OXYGEN SATURATION: 96 % | HEART RATE: 70 BPM | BODY MASS INDEX: 24.78 KG/M2 | WEIGHT: 187 LBS | SYSTOLIC BLOOD PRESSURE: 138 MMHG

## 2025-01-31 DIAGNOSIS — Z86.19 HISTORY OF SYPHILIS: ICD-10-CM

## 2025-01-31 DIAGNOSIS — R21 RASH: Primary | ICD-10-CM

## 2025-01-31 DIAGNOSIS — E78.2 MIXED HYPERLIPIDEMIA: Chronic | ICD-10-CM

## 2025-01-31 DIAGNOSIS — I10 PRIMARY HYPERTENSION: Chronic | ICD-10-CM

## 2025-01-31 DIAGNOSIS — N18.31 STAGE 3A CHRONIC KIDNEY DISEASE: ICD-10-CM

## 2025-01-31 DIAGNOSIS — Z13.9 SCREENING DUE: ICD-10-CM

## 2025-01-31 LAB
CHOLEST SERPL-MCNC: 177 MG/DL (ref 0–200)
HDLC SERPL-MCNC: 43 MG/DL (ref 40–60)
LDLC SERPL CALC-MCNC: 110 MG/DL (ref 0–100)
LDLC/HDLC SERPL: 2.48 {RATIO}
TRIGL SERPL-MCNC: 137 MG/DL (ref 0–150)
VLDLC SERPL-MCNC: 24 MG/DL (ref 5–40)

## 2025-01-31 PROCEDURE — 86780 TREPONEMA PALLIDUM: CPT

## 2025-01-31 PROCEDURE — 36415 COLL VENOUS BLD VENIPUNCTURE: CPT

## 2025-01-31 PROCEDURE — 80061 LIPID PANEL: CPT

## 2025-01-31 PROCEDURE — 99214 OFFICE O/P EST MOD 30 MIN: CPT | Performed by: STUDENT IN AN ORGANIZED HEALTH CARE EDUCATION/TRAINING PROGRAM

## 2025-01-31 PROCEDURE — 86592 SYPHILIS TEST NON-TREP QUAL: CPT

## 2025-01-31 PROCEDURE — 86593 SYPHILIS TEST NON-TREP QUANT: CPT

## 2025-01-31 PROCEDURE — 82306 VITAMIN D 25 HYDROXY: CPT

## 2025-01-31 RX ORDER — LOSARTAN POTASSIUM 25 MG/1
25 TABLET ORAL DAILY
Qty: 30 TABLET | Refills: 2 | Status: SHIPPED | OUTPATIENT
Start: 2025-01-31 | End: 2025-05-01

## 2025-01-31 NOTE — PROGRESS NOTES
Follow Up Office Visit      Date: 2025   Patient Name: Salvador Cortez  : 1965   MRN: [id number]     Chief Complaint:    Chief Complaint   Patient presents with    Hypertension    Diabetes     BG is 221 now       History of Present Illness: Salvador Cortez is a 59 y.o. male who is here today for follow-up.   Patient reports recent eruption of itchy rash around his upper extremities and trunk, patient states rash is similar to the episode he had when he tested positive for syphilis and was treated with penicillin> He denies new sexual exposure, genital and oral lesions.     Subjective      Review of Systems:   Review of Systems   Respiratory:  Negative for shortness of breath.    Cardiovascular:  Negative for chest pain and palpitations.   Skin:  Positive for rash.       I have reviewed the patients family history, social history, past medical history, past surgical history and have updated it as appropriate.     Medications:     Current Outpatient Medications:     BD Pen Needle Micro U/F 32G X 6 MM misc, , Disp: , Rfl:     Continuous Glucose Sensor (Dexcom G7 Sensor) misc, Use 1 each Every 10 (Ten) Days. To replace freestyle Wilbert 3., Disp: 9 each, Rfl: 1    doxycycline (MONODOX) 100 MG capsule, Take 1 capsule by mouth 2 (Two) Times a Day., Disp: 28 capsule, Rfl: 0    empagliflozin (JARDIANCE) 25 MG tablet tablet, Take 1 tablet by mouth Daily., Disp: 90 tablet, Rfl: 3    glucose blood test strip, Use as directed to monitor blood glucose TID, Disp: 100 each, Rfl: 5    glucose monitor monitoring kit, Use as directed to monitor blood glucose TID, Disp: 1 each, Rfl: 0    hydrOXYzine pamoate (Vistaril) 25 MG capsule, Take 1 capsule by mouth 3 (Three) Times a Day As Needed for Itching., Disp: 60 capsule, Rfl: 0    Lantus SoloStar 100 UNIT/ML injection pen, Inject 30 Units under the skin into the appropriate area as directed Daily., Disp: 9 mL, Rfl: 11    ursodiol (ACTIGALL) 300 MG capsule, Take 1  "capsule by mouth 3 (Three) Times a Day., Disp: 180 capsule, Rfl: 3     MG tablet, , Disp: , Rfl:     losartan (Cozaar) 25 MG tablet, Take 1 tablet by mouth Daily for 90 days., Disp: 30 tablet, Rfl: 2    nicotine (NICODERM CQ) 14 MG/24HR patch, Place 1 patch on the skin as directed by provider Daily for 60 days. (Patient not taking: Reported on 1/31/2025), Disp: 30 patch, Rfl: 1    potassium chloride (KLOR-CON M20) 20 MEQ CR tablet, Take 1 tablet by mouth Daily. (Patient not taking: Reported on 1/31/2025), Disp: 5 tablet, Rfl: 0    Allergies:   No Known Allergies    Objective     Physical Exam: Please see above  Vital Signs:   Vitals:    01/31/25 1409   BP: 138/76   Pulse: 70   SpO2: 96%   Weight: 84.8 kg (187 lb)   Height: 185.4 cm (73\")   PainSc: 0-No pain     Body mass index is 24.67 kg/m².  BMI is within normal parameters. No other follow-up for BMI required.       Physical Exam  Vitals and nursing note reviewed.   Constitutional:       Appearance: Normal appearance.   HENT:      Head: Normocephalic and atraumatic.      Mouth/Throat:      Mouth: Mucous membranes are moist.      Pharynx: Oropharynx is clear.   Eyes:      Extraocular Movements: Extraocular movements intact.      Pupils: Pupils are equal, round, and reactive to light.   Cardiovascular:      Rate and Rhythm: Normal rate and regular rhythm.      Pulses: Normal pulses.      Heart sounds: Normal heart sounds.   Pulmonary:      Effort: Pulmonary effort is normal.      Breath sounds: Normal breath sounds.   Musculoskeletal:         General: Normal range of motion.      Cervical back: Normal range of motion.   Skin:     General: Skin is warm.      Findings: Rash present.      Comments: Hyperpigmented macular rash around the right upper extremities bilaterally, not seen on the palms   Neurological:      Mental Status: He is alert and oriented to person, place, and time.   Psychiatric:         Mood and Affect: Mood normal.         Behavior: Behavior " normal.         Procedures    Results:   Labs:   Hemoglobin A1C   Date Value Ref Range Status   12/04/2024 5.6 4.5 - 5.7 % Final     TSH   Date Value Ref Range Status   12/05/2024 0.658 0.270 - 4.200 uIU/mL Final        POCT Results (if applicable):   Results for orders placed or performed in visit on 01/09/25   Comprehensive Metabolic Panel    Collection Time: 01/09/25 11:09 AM    Specimen: Arm, Left; Blood   Result Value Ref Range    Glucose 154 (H) 65 - 99 mg/dL    BUN 21 (H) 6 - 20 mg/dL    Creatinine 1.53 (H) 0.76 - 1.27 mg/dL    Sodium 137 136 - 145 mmol/L    Potassium 4.2 3.5 - 5.2 mmol/L    Chloride 100 98 - 107 mmol/L    CO2 26.0 22.0 - 29.0 mmol/L    Calcium 8.8 8.6 - 10.5 mg/dL    Total Protein 7.0 6.0 - 8.5 g/dL    Albumin 3.8 3.5 - 5.2 g/dL    ALT (SGPT) 10 1 - 41 U/L    AST (SGOT) 19 1 - 40 U/L    Alkaline Phosphatase 76 39 - 117 U/L    Total Bilirubin 0.7 0.0 - 1.2 mg/dL    Globulin 3.2 gm/dL    A/G Ratio 1.2 g/dL    BUN/Creatinine Ratio 13.7 7.0 - 25.0    Anion Gap 11.0 5.0 - 15.0 mmol/L    eGFR 52.0 (L) >60.0 mL/min/1.73       Imaging:   No valid procedures specified.       Assessment / Plan      Assessment/Plan:   Diagnoses and all orders for this visit:    1. Rash (Primary)  - no lesions seen in the oral cavity, no rash in the palms, sparse hyperpigmented macules seen on the right upper extremity  - will repeat non-treponemal testing    2. History of syphilis  -     Fluorescent treponema AB, CSF - Cerebrospinal Fluid, Lumbar Puncture; Future  -     RPR Qualitative with Reflex to Quant; Future    3. Mixed hyperlipidemia  - 10 year ASCVD risk of 26.9%: high intensity statin recommended  -     Lipid Panel; Future    4. Screening due  -     Vitamin D,25-Hydroxy; Future    5. Stage 3a chronic kidney disease  -     losartan (Cozaar) 25 MG tablet; Take 1 tablet by mouth Daily for 90 days.  Dispense: 30 tablet; Refill: 2    6. Primary hypertension  - did not provide home logs but reported that values have  fluctuated between lows and highs  -     losartan (Cozaar) 25 MG tablet; Take 1 tablet by mouth Daily for 90 days.  Dispense: 30 tablet; Refill: 2        Vaccine Counseling:      Follow Up:   Return in about 3 months (around 4/30/2025) for Recheck.      Joseph Solorzano MD  [facility]

## 2025-02-01 LAB
25(OH)D3 SERPL-MCNC: 12.3 NG/ML (ref 30–100)
RPR SER QL: REACTIVE
RPR SER-TITR: ABNORMAL {TITER}

## 2025-02-03 ENCOUNTER — TELEPHONE (OUTPATIENT)
Dept: INTERNAL MEDICINE | Facility: CLINIC | Age: 60
End: 2025-02-03
Payer: COMMERCIAL

## 2025-02-03 ENCOUNTER — LAB (OUTPATIENT)
Dept: LAB | Facility: HOSPITAL | Age: 60
End: 2025-02-03
Payer: COMMERCIAL

## 2025-02-03 DIAGNOSIS — A53.9 SYPHILIS: ICD-10-CM

## 2025-02-03 DIAGNOSIS — E78.5 DYSLIPIDEMIA: Primary | ICD-10-CM

## 2025-02-03 DIAGNOSIS — A51.5 SYPHILIS, EARLY LATENT: Primary | ICD-10-CM

## 2025-02-03 LAB — TREPONEMA PALLIDUM IGG+IGM AB [PRESENCE] IN SERUM OR PLASMA BY IMMUNOASSAY: REACTIVE

## 2025-02-03 PROCEDURE — G0432 EIA HIV-1/HIV-2 SCREEN: HCPCS

## 2025-02-03 RX ORDER — ATORVASTATIN CALCIUM 40 MG/1
40 TABLET, FILM COATED ORAL DAILY
Qty: 30 TABLET | Refills: 2 | Status: SHIPPED | OUTPATIENT
Start: 2025-02-03 | End: 2025-05-04

## 2025-02-03 RX ORDER — DOXYCYCLINE 100 MG/1
100 CAPSULE ORAL 2 TIMES DAILY
Qty: 28 CAPSULE | Refills: 0 | Status: SHIPPED | OUTPATIENT
Start: 2025-02-03 | End: 2025-02-17

## 2025-02-03 NOTE — TELEPHONE ENCOUNTER
Just verified. [facility] [place] have penicillin injections. We are unable to administered this at this office. Is there an antibiotic that can be sent to pts pharmacy

## 2025-02-03 NOTE — TELEPHONE ENCOUNTER
Called and relayed message. Pt verbalized understanding and will come tomorrow for lab work and injection

## 2025-02-03 NOTE — TELEPHONE ENCOUNTER
Pt Called and I relayed message. Pt verbalized understanding.   Reports he will come in tomorrow to have labs drawn.       Pt is asking can he get have a prescription of Penicillin tablets to take instead, because he does not want to get the injection.

## 2025-02-03 NOTE — TELEPHONE ENCOUNTER
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

## 2025-02-04 LAB — HIV 1+2 AB+HIV1 P24 AG SERPL QL IA: NORMAL

## 2025-02-17 ENCOUNTER — TELEPHONE (OUTPATIENT)
Dept: INTERNAL MEDICINE | Facility: CLINIC | Age: 60
End: 2025-02-17
Payer: COMMERCIAL

## 2025-02-17 NOTE — TELEPHONE ENCOUNTER
Pt is requesting his viagra 100 mg be refilled and sent to [facility] [address]. He said he only has 2 pills left. It was last filled by his previous provider, Dr Barillas and was told at his appointment with Jeanine to call when he needed refilled.     He also would like to come in to have his labs re-drawn. (He said to check to see if something was out of his system???)    Please call the patient back at [phone] when these have been completed.

## 2025-02-18 ENCOUNTER — TELEPHONE (OUTPATIENT)
Dept: ENDOCRINOLOGY | Facility: CLINIC | Age: 60
End: 2025-02-18
Payer: COMMERCIAL

## 2025-02-18 NOTE — TELEPHONE ENCOUNTER
Spoke with patient regarding PC at [place]. Patient stated Meenu recommended Dr. Solorzano, but Dr. Solorzano is leaving the practice. He is requesting a call back to discuss another recommendation. Please advise.     Phone [phone]

## 2025-02-19 ENCOUNTER — TELEPHONE (OUTPATIENT)
Dept: INTERNAL MEDICINE | Facility: CLINIC | Age: 60
End: 2025-02-19
Payer: COMMERCIAL

## 2025-02-19 NOTE — TELEPHONE ENCOUNTER
A message has been sent to provider regarding ordering labs and medication refill. Will await provider response.

## 2025-02-19 NOTE — TELEPHONE ENCOUNTER
PATIENT HAS CALLED AND STATED HE IS NEEDING A LAB ORDER PUT IN ASA TO CHECK AND SEE IF SOME TYPE OF DISEASE IS OUT OF HIS SYTEM. PATIENT ALSO STATES HE IS NEEDING A NEW PRESCRIPTION FOR VIAGRA 100 MG. PATIENT STATES HE WAS BEING PRESCRIBED MEDICATION FROM PREVIOUS PROVIDER AND IS NOW DUE FOR A NEW PRESCRIPTION.  PATIENT IS REQUESTING A CALL BACK ONCE LAB ORDER HAS BEEN PLACED.    CALL BACK NUMBER [phone]

## 2025-02-19 NOTE — TELEPHONE ENCOUNTER
Patient states he needs further std blood work completed to see if he is still positive. Patient states his wife has now tested positive. Patient is needing his Viagra refilled.

## 2025-02-19 NOTE — TELEPHONE ENCOUNTER
PATIENT CALLED BACK STATING HE WAS COMING IN TO GET LABWORK TOMORROW REGARDING PREVIOUS MESSAGE.  DIDN'T KNOW IF ORDERS HAD BEEN PLACED.  HE ALSO MENTIONED REFILLING HIS VIAGRA AGAIN.

## 2025-02-19 NOTE — TELEPHONE ENCOUNTER
I would recommend anyone who is available in that office or there [facility] on [address].

## 2025-02-20 NOTE — TELEPHONE ENCOUNTER
Per Dr. Solorzano, pt does not need follow up labs for retesting at this time. Pt notified and verbalized understanding. She will look into the refill he is requesting and send that in if needed. Pt notified. He is already set up to see Dr. Yeung in 5/2025 for est care visit.

## 2025-02-21 DIAGNOSIS — N52.9 ERECTILE DYSFUNCTION, UNSPECIFIED ERECTILE DYSFUNCTION TYPE: Primary | ICD-10-CM

## 2025-02-21 RX ORDER — SILDENAFIL 50 MG/1
50 TABLET, FILM COATED ORAL DAILY PRN
Qty: 30 TABLET | Refills: 0 | Status: SHIPPED | OUTPATIENT
Start: 2025-02-21 | End: 2025-03-23

## 2025-02-21 NOTE — TELEPHONE ENCOUNTER
Sildenafil has been sent, please inform patient to encourage wife to be treated as well. She can schedule an appointment with us to establish care and be tested and treated for syphilis.    Dr. Solorzano

## 2025-02-26 ENCOUNTER — DOCUMENTATION (OUTPATIENT)
Dept: ENDOCRINOLOGY | Facility: CLINIC | Age: 60
End: 2025-02-26
Payer: COMMERCIAL

## 2025-02-26 NOTE — PROGRESS NOTES
Pt came by office for assistance with [employer] berta. Helped pt set up on his new phone. Pt will transition to phone berta at next sensor change.

## 2025-03-07 ENCOUNTER — OFFICE VISIT (OUTPATIENT)
Dept: ENDOCRINOLOGY | Facility: CLINIC | Age: 60
End: 2025-03-07

## 2025-03-07 VITALS
SYSTOLIC BLOOD PRESSURE: 118 MMHG | HEIGHT: 73 IN | HEART RATE: 69 BPM | WEIGHT: 188 LBS | BODY MASS INDEX: 24.92 KG/M2 | OXYGEN SATURATION: 100 % | DIASTOLIC BLOOD PRESSURE: 50 MMHG

## 2025-03-07 DIAGNOSIS — Z79.4 TYPE 2 DIABETES MELLITUS WITH DIABETIC NEPHROPATHY, WITH LONG-TERM CURRENT USE OF INSULIN: Primary | ICD-10-CM

## 2025-03-07 DIAGNOSIS — E11.21 TYPE 2 DIABETES MELLITUS WITH DIABETIC NEPHROPATHY, WITH LONG-TERM CURRENT USE OF INSULIN: Primary | ICD-10-CM

## 2025-03-07 LAB
EXPIRATION DATE: ABNORMAL
EXPIRATION DATE: ABNORMAL
GLUCOSE BLDC GLUCOMTR-MCNC: 209 MG/DL (ref 70–130)
HBA1C MFR BLD: 5.8 % (ref 4.5–5.7)
Lab: ABNORMAL
Lab: ABNORMAL

## 2025-03-07 NOTE — ASSESSMENT & PLAN NOTE
Diabetes is stable.   Continue current treatment regimen.  Reminded to bring in blood sugar diary at next visit.  Recommended an ADA diet.  Regular aerobic exercise.    Patient stable on current medications and has recently started using Dexcom more consistently.  Will continue current regimen.    Diabetes will be reassessed in 3 months

## 2025-03-07 NOTE — PROGRESS NOTES
Office Note      Date: 2025  Patient Name: Salvador Cortez  MRN: [id number]  : 1965    Chief Complaint   Patient presents with    Diabetes     Type 2 diabetes mellitus with diabetic nephropathy, with long-term current use of insulin    Hypertension    Hyperlipidemia       History of Present Illness:   Salvador Cortez is a 59 y.o. male who presents for Diabetes type 2.   Diagnosed: 2019  Current RX:  Lantus 26 units, Jardiance 25 mg daily     Bg checks are done: has transitioned to Dexcom G7  Hypoglycemia :occasionally    Patient is doing well with current meds.     Last A1c:  Hemoglobin A1C   Date Value Ref Range Status   2025 5.8 (A) 4.5 - 5.7 % Final     The last 2 weeks of CGM data are reviewed.  0 % are low  85 % are in range  15 % are 180-250  0 % are >250  GMI: 6.7  The glycemic pattern shows: Glucose averages generally in target range with some occasional postprandial hyperglycemia, higher after midday meal and in the evening    Changes in health since last visit: none.     DM Health Maintenance:  Ophtho: 2024  Monofilament / Foot exam: 24   Lipids/Statin: not taking a statin with last FLP showing LDL 24  KARIE: 24   TSH: 24   Aspirin: not taking  ACE/ARB: no      Diabetic Complications:  Eyes: No  Kidneys: No  Feet: No  Heart: No      Subjective          Review of Systems:   Review of Systems   Constitutional:  Positive for fatigue. Negative for activity change and appetite change.   Respiratory:  Negative for chest tightness and shortness of breath.    Gastrointestinal:  Negative for abdominal pain.   Musculoskeletal:  Negative for myalgias.   Neurological:  Negative for numbness.   Psychiatric/Behavioral:  The patient is not nervous/anxious.        The following portions of the patient's history were reviewed and updated as appropriate: allergies, current medications, past family history, past medical history, past social history, past surgical history,  "and problem list.    Objective     Visit Vitals  /50 (BP Location: Left arm, Patient Position: Sitting, Cuff Size: Adult)   Pulse 69   Ht 185.4 cm (73\")   Wt 85.3 kg (188 lb)   SpO2 100%   BMI 24.80 kg/m²           Physical Exam:  Physical Exam  Constitutional:       Appearance: He is well-developed.   HENT:      Head: Normocephalic and atraumatic.      Right Ear: External ear normal.      Left Ear: External ear normal.   Eyes:      Conjunctiva/sclera: Conjunctivae normal.   Cardiovascular:      Rate and Rhythm: Normal rate and regular rhythm.   Pulmonary:      Effort: Pulmonary effort is normal.      Breath sounds: Normal breath sounds.   Musculoskeletal:         General: Normal range of motion.      Cervical back: Normal range of motion.   Skin:     General: Skin is warm and dry.   Psychiatric:         Behavior: Behavior normal.          Assessment / Plan      Assessment & Plan:  Diagnoses and all orders for this visit:    1. Type 2 diabetes mellitus with diabetic nephropathy, with long-term current use of insulin (Primary)  Assessment & Plan:  Diabetes is stable.   Continue current treatment regimen.  Reminded to bring in blood sugar diary at next visit.  Recommended an ADA diet.  Regular aerobic exercise.    Patient stable on current medications and has recently started using Dexcom more consistently.  Will continue current regimen.    Diabetes will be reassessed in 3 months    Orders:  -     POC Glucose, Blood  -     POC Glycosylated Hemoglobin (Hb A1C)  -     empagliflozin (JARDIANCE) 25 MG tablet tablet; Take 1 tablet by mouth Daily.  Dispense: 90 tablet; Refill: 3        Return in about 3 months (around 6/7/2025) for Follow up.    Portions of this note were completed with voice recognition program.  Electronically signed by Meenu Moore PA-C  [facility]  03/07/2025  "

## 2025-03-10 ENCOUNTER — PRIOR AUTHORIZATION (OUTPATIENT)
Dept: ENDOCRINOLOGY | Facility: CLINIC | Age: 60
End: 2025-03-10

## 2025-03-10 ENCOUNTER — TELEPHONE (OUTPATIENT)
Dept: ENDOCRINOLOGY | Facility: CLINIC | Age: 60
End: 2025-03-10

## 2025-03-10 DIAGNOSIS — L29.9 ITCHING: ICD-10-CM

## 2025-03-10 RX ORDER — HYDROXYZINE PAMOATE 25 MG/1
25 CAPSULE ORAL 3 TIMES DAILY PRN
Qty: 60 CAPSULE | Refills: 0 | Status: CANCELLED | OUTPATIENT
Start: 2025-03-10

## 2025-03-10 RX ORDER — HYDROXYZINE PAMOATE 25 MG/1
25 CAPSULE ORAL 3 TIMES DAILY PRN
Qty: 60 CAPSULE | Refills: 0 | Status: SHIPPED | OUTPATIENT
Start: 2025-03-10

## 2025-03-10 NOTE — TELEPHONE ENCOUNTER
Salvador Cortez (Key: [id number])  PA Case ID #: [id number]  Rx #: [id number]  Need Help? Call us at [phone]  Outcome  Approved today by [place] [employer] MedImpact 2017  The request has been approved. The authorization is effective from 03/10/2025 to 03/10/2026, as long as the member is enrolled in their current health plan. A written notification letter will follow with additional details.  Effective Date: 3/9/2025  Authorization Expiration Date: 3/9/2026  Drug  Jardiance 25MG tablets  ePA cloud logo  Form  MedImpact [place] Medicaid ePA Form 2017 NCPDP

## 2025-03-10 NOTE — TELEPHONE ENCOUNTER
Patient advised his medication Jardiance is at [facility] in [facility] but the pharmacy is saying that the patient needs to contact his insurance regarding the medicine. This medication may need a prior authorization. Please advise.

## 2025-03-10 NOTE — TELEPHONE ENCOUNTER
PATIENT CALLED AND STATES THAT HE NEEDS A REFILL OF hydrOXYzine pamoate (Vistaril) 25 MG capsule     PATIENT STATES THAT HE HAS BEEN OUT OF IT FOR 5 DAYS.    PHAR: [facility] [address] [phone]      CALL BACK: [phone]

## 2025-03-11 ENCOUNTER — TELEPHONE (OUTPATIENT)
Dept: INTERNAL MEDICINE | Facility: CLINIC | Age: 60
End: 2025-03-11

## 2025-03-11 NOTE — TELEPHONE ENCOUNTER
PATIENT IS CALLING BECAUSE HE NEEDS HIS INHALER. PATIENT TRIED TO  FROM PHARMACY AND WAS ADVISED THAT HE NEEDS A NEW RX. PATIENT DOES NOT KNOW THE NAME OF THE MEDICATION BUT WOULD LIKE A NEW RX SENT IN TO PHARMACY ON FILE.

## 2025-03-12 ENCOUNTER — TELEPHONE (OUTPATIENT)
Dept: INTERNAL MEDICINE | Facility: CLINIC | Age: 60
End: 2025-03-12

## 2025-03-12 DIAGNOSIS — Z72.0 TOBACCO USE: Primary | ICD-10-CM

## 2025-03-12 RX ORDER — POLYETHYLENE GLYCOL 3350 17 G
2 POWDER IN PACKET (EA) ORAL AS NEEDED
Qty: 378 LOZENGE | Refills: 2 | Status: SHIPPED | OUTPATIENT
Start: 2025-03-12

## 2025-03-12 RX ORDER — ALBUTEROL SULFATE 90 UG/1
2 INHALANT RESPIRATORY (INHALATION) EVERY 6 HOURS PRN
Qty: 18 G | Refills: 1 | Status: SHIPPED | OUTPATIENT
Start: 2025-03-12

## 2025-03-12 NOTE — TELEPHONE ENCOUNTER
nicotine polacrilex (Nicotine Mini) 2 MG lozenge refilled in , for #378 with 2 refills. Pt requests refill now. Order has  so it is not on his med list anymore. Please advise if ok to send refill.

## 2025-03-12 NOTE — TELEPHONE ENCOUNTER
PATIENT CALLED AND IS REQUESTING A REFILL OF NICOTENE POLACRLLEX CZENE 2MG TABLETS PRN WHEN CRAVING TOBACCO.  MYSELF AND MA SEARCHED PATIENT'S CHART AND COULD NOT FIND A PREVIOUS MEDICATION PRESCRIBED TO PATIENT.  HE SAYS DR MATOS PRESCRIBED AT LAST APPOINTMENT IN JANUARY.      HE STATES HE RECEIVED 4 BOTTLES OF 24 EACH AND IS ALMOST OUT.      PLEASE CALL PATIENT: [phone]    PHARMACY-[facility]  PH:[phone]  FAX:[phone]

## 2025-03-14 PROBLEM — M25.472 ANKLE SWELLING, LEFT: Status: ACTIVE | Noted: 2024-03-13

## 2025-03-14 PROBLEM — M76.12 PSOAS TENDONITIS OF LEFT SIDE: Status: ACTIVE | Noted: 2023-03-09

## 2025-03-14 PROBLEM — T84.84XA PAIN DUE TO TOTAL HIP REPLACEMENT: Status: ACTIVE | Noted: 2021-12-13

## 2025-03-14 PROBLEM — M16.32 OSTEOARTHRITIS RESULTING FROM LEFT HIP DYSPLASIA: Status: ACTIVE | Noted: 2017-05-17

## 2025-03-14 PROBLEM — M62.838 MUSCLE SPASM OF RIGHT LOWER EXTREMITY: Status: ACTIVE | Noted: 2023-11-22

## 2025-03-14 PROBLEM — H57.89 IRRITATION OF BOTH EYES: Status: ACTIVE | Noted: 2023-09-15

## 2025-03-14 PROBLEM — E11.65 HYPERGLYCEMIA DUE TO DIABETES MELLITUS: Chronic | Status: ACTIVE | Noted: 2023-09-14

## 2025-03-14 PROBLEM — H93.8X2 EAR CONGESTION, LEFT: Status: ACTIVE | Noted: 2024-03-13

## 2025-03-14 PROBLEM — E11.59 TYPE II DIABETES CIRCULATORY DISORDER CAUSING ERECTILE DYSFUNCTION: Chronic | Status: ACTIVE | Noted: 2018-05-21

## 2025-03-14 PROBLEM — M25.572 CHRONIC PAIN OF LEFT ANKLE: Status: ACTIVE | Noted: 2024-03-13

## 2025-03-14 PROBLEM — G56.91 NEUROPATHY OF FINGER OF RIGHT HAND: Status: ACTIVE | Noted: 2023-09-13

## 2025-03-14 PROBLEM — N52.1 TYPE II DIABETES CIRCULATORY DISORDER CAUSING ERECTILE DYSFUNCTION: Chronic | Status: ACTIVE | Noted: 2018-05-21

## 2025-03-14 PROBLEM — N52.1 ERECTILE DYSFUNCTION DUE TO DISEASES CLASSIFIED ELSEWHERE: Status: ACTIVE | Noted: 2023-09-13

## 2025-03-14 PROBLEM — G89.29 CHRONIC PAIN OF LEFT ANKLE: Status: ACTIVE | Noted: 2024-03-13

## 2025-03-14 PROBLEM — R59.9 ADENOPATHY: Status: ACTIVE | Noted: 2024-02-21

## 2025-03-14 PROBLEM — Z96.649 PAIN DUE TO TOTAL HIP REPLACEMENT: Status: ACTIVE | Noted: 2021-12-13

## 2025-03-14 PROBLEM — R63.4 RAPID WEIGHT LOSS: Status: ACTIVE | Noted: 2023-09-13

## 2025-03-14 PROBLEM — Z59.71 DOES NOT HAVE HEALTH INSURANCE: Status: ACTIVE | Noted: 2023-11-22

## 2025-03-14 PROBLEM — R68.89 FEELING UNWELL: Status: ACTIVE | Noted: 2023-09-13

## 2025-03-14 PROBLEM — Z12.11 SCREENING FOR COLON CANCER: Status: ACTIVE | Noted: 2023-09-13

## 2025-03-14 PROBLEM — M79.89 LEFT LEG SWELLING: Status: ACTIVE | Noted: 2024-02-21

## 2025-03-14 PROBLEM — R35.0 INCREASED FREQUENCY OF URINATION: Status: ACTIVE | Noted: 2023-09-15

## 2025-03-14 PROBLEM — R19.09 GROIN SWELLING: Status: ACTIVE | Noted: 2024-01-10

## 2025-03-17 ENCOUNTER — OFFICE VISIT (OUTPATIENT)
Dept: INTERNAL MEDICINE | Facility: CLINIC | Age: 60
End: 2025-03-17
Payer: MEDICAID

## 2025-03-17 ENCOUNTER — HOSPITAL ENCOUNTER (OUTPATIENT)
Dept: CARDIOLOGY | Facility: HOSPITAL | Age: 60
Discharge: HOME | End: 2025-03-17
Admitting: INTERNAL MEDICINE
Payer: MEDICAID

## 2025-03-17 VITALS — HEIGHT: 72 IN | BODY MASS INDEX: 24.66 KG/M2 | WEIGHT: 182.1 LBS

## 2025-03-17 VITALS
SYSTOLIC BLOOD PRESSURE: 160 MMHG | TEMPERATURE: 97.2 F | WEIGHT: 182.2 LBS | HEIGHT: 72 IN | BODY MASS INDEX: 24.68 KG/M2 | DIASTOLIC BLOOD PRESSURE: 70 MMHG | OXYGEN SATURATION: 99 %

## 2025-03-17 DIAGNOSIS — L81.9 DISCOLORATION OF SKIN OF LOWER LEG: Primary | ICD-10-CM

## 2025-03-17 DIAGNOSIS — L81.9 DISCOLORATION OF SKIN OF LOWER LEG: ICD-10-CM

## 2025-03-17 DIAGNOSIS — I10 PRIMARY HYPERTENSION: Primary | Chronic | ICD-10-CM

## 2025-03-17 DIAGNOSIS — N52.9 ERECTILE DYSFUNCTION, UNSPECIFIED ERECTILE DYSFUNCTION TYPE: ICD-10-CM

## 2025-03-17 DIAGNOSIS — E78.2 MIXED HYPERLIPIDEMIA: Chronic | ICD-10-CM

## 2025-03-17 LAB
BH CV GRAFT BRACHIAL PRESSURE RIGHT: 147 MMHG
BH CV LEA LEFT ANT TIBIAL A DISTAL PSV: 101.2 CM/S
BH CV LEA LEFT CFA DISTAL PSV: 159.5 CM/S
BH CV LEA LEFT DFA PROX PSV: 186.11 CM/S
BH CV LEA LEFT DPA PRESSURE: 170 MMHG
BH CV LEA LEFT PERONEAL  MID PSV: 84 CM/S
BH CV LEA LEFT POPITEAL A  DISTAL PSV: 109.1 CM/S
BH CV LEA LEFT POPITEAL A  PROX PSV: 67.9 CM/S
BH CV LEA LEFT PTA DISTAL PSV: 128.5 CM/S
BH CV LEA LEFT PTA PRESSURE: 180 MMHG
BH CV LEA LEFT SFA DISTAL PSV: 108 CM/S
BH CV LEA LEFT SFA MID PSV: 125.9 CM/S
BH CV LEA LEFT SFA PROX PSV: 138.3 CM/S
BH CV LEA LEFT TIBEOPERONEAL PSV: 142.16 CM/S
BH CV LEA RIGHT ANT TIBIAL A DISTAL PSV: 109.7 CM/S
BH CV LEA RIGHT CFA DISTAL PSV: 146.4 CM/S
BH CV LEA RIGHT DFA PROX PSV: 144.95 CM/S
BH CV LEA RIGHT DPA PRESSURE: 156 MMHG
BH CV LEA RIGHT PERONEAL  MID PSV: 77 CM/S
BH CV LEA RIGHT POPITEAL A  DISTAL PSV: 116.8 CM/S
BH CV LEA RIGHT POPITEAL A  PROX PSV: 74 CM/S
BH CV LEA RIGHT PTA DISTAL PSV: 109.1 CM/S
BH CV LEA RIGHT PTA PRESSURE: 152 MMHG
BH CV LEA RIGHT SFA DISTAL PSV: 85.7 CM/S
BH CV LEA RIGHT SFA MID PSV: 136.6 CM/S
BH CV LEA RIGHT SFA PROX PSV: 154.4 CM/S
BH CV LEA RIGHT TIBEOPERONEAL PSV: 164.87 CM/S
BH CV LOWER ARTERIAL LEFT ABI RATIO: 1.22
BH CV LOWER ARTERIAL RIGHT ABI RATIO: 1.06

## 2025-03-17 PROCEDURE — 1159F MED LIST DOCD IN RCRD: CPT | Performed by: INTERNAL MEDICINE

## 2025-03-17 PROCEDURE — 3077F SYST BP >= 140 MM HG: CPT | Performed by: INTERNAL MEDICINE

## 2025-03-17 PROCEDURE — 99214 OFFICE O/P EST MOD 30 MIN: CPT | Performed by: INTERNAL MEDICINE

## 2025-03-17 PROCEDURE — 1160F RVW MEDS BY RX/DR IN RCRD: CPT | Performed by: INTERNAL MEDICINE

## 2025-03-17 PROCEDURE — 3078F DIAST BP <80 MM HG: CPT | Performed by: INTERNAL MEDICINE

## 2025-03-17 PROCEDURE — 3044F HG A1C LEVEL LT 7.0%: CPT | Performed by: INTERNAL MEDICINE

## 2025-03-17 PROCEDURE — 93925 LOWER EXTREMITY STUDY: CPT | Performed by: INTERNAL MEDICINE

## 2025-03-17 PROCEDURE — 1126F AMNT PAIN NOTED NONE PRSNT: CPT | Performed by: INTERNAL MEDICINE

## 2025-03-17 PROCEDURE — 93925 LOWER EXTREMITY STUDY: CPT

## 2025-03-17 RX ORDER — SILDENAFIL 50 MG/1
50 TABLET, FILM COATED ORAL DAILY PRN
Qty: 30 TABLET | Refills: 5 | Status: SHIPPED | OUTPATIENT
Start: 2025-03-17 | End: 2025-04-16

## 2025-03-17 RX ORDER — LOSARTAN POTASSIUM 50 MG/1
50 TABLET ORAL DAILY
Qty: 30 TABLET | Refills: 2 | Status: SHIPPED | OUTPATIENT
Start: 2025-03-17

## 2025-03-17 NOTE — PROGRESS NOTES
"Subjective   Salvador Cortez is a 59 y.o. male.     History of Present Illness   Previous patient of Dr. Solorzano now establishing care with me.  Chronic conditions: HTN, HLP, and DM. DM managed by Endo. HTN  not controlled with losartan. HLP controlled with Lipitor. No CP or SOA. Noticed a slight discoloration in left leg over last month. No pain with it.   The following portions of the patient's history were reviewed and updated as appropriate: allergies, current medications, past family history, past medical history, past social history, past surgical history, and problem list.    Review of Systems   Constitutional:  Negative for fatigue and fever.   Respiratory:  Negative for cough and shortness of breath.    Cardiovascular:  Negative for chest pain and leg swelling.   Psychiatric/Behavioral:  Negative for dysphoric mood.      /70 (BP Location: Left arm, Patient Position: Sitting, Cuff Size: Adult)   Temp 97.2 °F (36.2 °C) (Temporal)   Ht 183 cm (72.05\")   Wt 82.6 kg (182 lb 3.2 oz)   SpO2 99%   BMI 24.68 kg/m²     Objective   Physical Exam  Vitals reviewed.   Cardiovascular:      Rate and Rhythm: Normal rate and regular rhythm.   Pulmonary:      Effort: No respiratory distress.      Breath sounds: No wheezing.   Musculoskeletal:      Comments: Pedal pulses palpable.   Left left slight darker brown from just above ankle.    Neurological:      Mental Status: He is alert and oriented to person, place, and time.   Psychiatric:         Behavior: Behavior normal.         Assessment & Plan   Diagnoses and all orders for this visit:    1. Primary hypertension (Primary)  -     losartan (COZAAR) 50 MG tablet; Take 1 tablet by mouth Daily.  Dispense: 30 tablet; Refill: 2  BP not controlled with the 25 mg. Increase to 50 mg po qd. 3 week f/u  2. Mixed hyperlipidemia  Continue Lipitor 40 mg po qhs  3. Erectile dysfunction, unspecified erectile dysfunction type  -     sildenafil (VIAGRA) 50 MG tablet; Take 1 tablet " by mouth Daily As Needed for Erectile Dysfunction (take 1 to 4 hours before sexual activity) for up to 30 days.  Dispense: 30 tablet; Refill: 5    4. Discoloration of skin of lower leg  -     Cancel: US Arterial Doppler Lower Extremity Complete; Future  -     US Arterial Doppler Lower Extremity Complete; Future

## 2025-03-19 ENCOUNTER — RESULTS FOLLOW-UP (OUTPATIENT)
Dept: CARDIOLOGY | Facility: HOSPITAL | Age: 60
End: 2025-03-19
Payer: MEDICAID

## 2025-03-19 NOTE — TELEPHONE ENCOUNTER
"Patient is requesting more information about labs that he had spoke to dr hameed about to check on \" something in his body \" I asked if he knew which labs they were ad patient could not remember.   "

## 2025-03-19 NOTE — TELEPHONE ENCOUNTER
Caller: Salvador Cortez TRENT    Relationship: Self    Best call back number: [phone]     Requested Prescriptions:   Requested Prescriptions     Pending Prescriptions Disp Refills    Continuous Glucose Sensor (Dexcom G7 Sensor) misc 9 each 1     Sig: Use 1 each Every 10 (Ten) Days. To replace freestyle Wilbert 3.        Pharmacy where request should be sent: [facility] [id number] [address] [phone] [facility] [phone] FX     Last office visit with prescribing clinician: 3/17/2025   Last telemedicine visit with prescribing clinician: Visit date not found   Next office visit with prescribing clinician: 4/4/2025     Does the patient have less than a 3 day supply:  [x] Yes  [] No    Would you like a call back once the refill request has been completed: [] Yes [] No    If the office needs to give you a call back, can they leave a voicemail: [] Yes [] No    Jess Dubon Rep   03/19/25 14:53 EDT

## 2025-03-20 RX ORDER — ACYCLOVIR 400 MG/1
1 TABLET ORAL
Qty: 9 EACH | Refills: 1 | Status: SHIPPED | OUTPATIENT
Start: 2025-03-20

## 2025-04-04 ENCOUNTER — OFFICE VISIT (OUTPATIENT)
Dept: INTERNAL MEDICINE | Facility: CLINIC | Age: 60
End: 2025-04-04
Payer: MEDICAID

## 2025-04-04 VITALS
DIASTOLIC BLOOD PRESSURE: 78 MMHG | TEMPERATURE: 97.4 F | HEIGHT: 72 IN | WEIGHT: 185.6 LBS | SYSTOLIC BLOOD PRESSURE: 122 MMHG | OXYGEN SATURATION: 98 % | BODY MASS INDEX: 25.14 KG/M2

## 2025-04-04 DIAGNOSIS — M79.671 BILATERAL FOOT PAIN: ICD-10-CM

## 2025-04-04 DIAGNOSIS — M79.672 BILATERAL FOOT PAIN: ICD-10-CM

## 2025-04-04 DIAGNOSIS — I10 PRIMARY HYPERTENSION: Primary | Chronic | ICD-10-CM

## 2025-04-04 PROCEDURE — 3078F DIAST BP <80 MM HG: CPT | Performed by: INTERNAL MEDICINE

## 2025-04-04 PROCEDURE — 1125F AMNT PAIN NOTED PAIN PRSNT: CPT | Performed by: INTERNAL MEDICINE

## 2025-04-04 PROCEDURE — 3044F HG A1C LEVEL LT 7.0%: CPT | Performed by: INTERNAL MEDICINE

## 2025-04-04 PROCEDURE — 1159F MED LIST DOCD IN RCRD: CPT | Performed by: INTERNAL MEDICINE

## 2025-04-04 PROCEDURE — 99213 OFFICE O/P EST LOW 20 MIN: CPT | Performed by: INTERNAL MEDICINE

## 2025-04-04 PROCEDURE — 3074F SYST BP LT 130 MM HG: CPT | Performed by: INTERNAL MEDICINE

## 2025-04-04 PROCEDURE — 1160F RVW MEDS BY RX/DR IN RCRD: CPT | Performed by: INTERNAL MEDICINE

## 2025-04-04 NOTE — PROGRESS NOTES
"Subjective   Salvador Cortez is a 59 y.o. male.   Chief Complaint   Patient presents with    Hypertension       History of Present Illness   3 week f/u on HTN. HTN  was not controlled with losartan and 3 weeks ago increased dose from 25 o 50 mg once a day. BP better today.  Bilateral foot pain for years. Has seen podiatry before.  Ear fullness. No pain or fever. No vertigo.   The following portions of the patient's history were reviewed and updated as appropriate: allergies, current medications, past family history, past medical history, past social history, past surgical history, and problem list.    Review of Systems   Constitutional:  Negative for fatigue and fever.   HENT:          Ear fullness   Respiratory:  Negative for cough and shortness of breath.    Cardiovascular:  Negative for chest pain and leg swelling.   Musculoskeletal:         Foot pain   Neurological:  Negative for headaches.     /78 (BP Location: Left arm, Patient Position: Sitting, Cuff Size: Adult)   Temp 97.4 °F (36.3 °C) (Temporal)   Ht 183 cm (72.05\")   Wt 84.2 kg (185 lb 9.6 oz)   SpO2 98%   BMI 25.14 kg/m²     Objective   Physical Exam  Vitals reviewed.   HENT:      Right Ear: There is impacted cerumen (s/p removal).      Left Ear: There is impacted cerumen (s/p removal).   Cardiovascular:      Rate and Rhythm: Normal rate and regular rhythm.   Pulmonary:      Effort: No respiratory distress.      Breath sounds: No wheezing.   Neurological:      General: No focal deficit present.      Mental Status: He is alert and oriented to person, place, and time.   Psychiatric:         Mood and Affect: Mood normal.         Behavior: Behavior normal.         Assessment & Plan   Diagnoses and all orders for this visit:    1. Primary hypertension (Primary)  Better control with the increase from 25 to 50 mg losartan daily. Continue the 50mg once a day/  2. Bilateral foot pain  -     Diclofenac Sodium (VOLTAREN) 1 % gel gel; Apply 4 g " topically to the appropriate area as directed 4 (Four) Times a Day As Needed (foot pain).  Dispense: 100 g; Refill: 0

## 2025-04-10 DIAGNOSIS — L29.9 ITCHING: ICD-10-CM

## 2025-04-10 RX ORDER — HYDROXYZINE PAMOATE 25 MG/1
25 CAPSULE ORAL 3 TIMES DAILY PRN
Qty: 60 CAPSULE | Refills: 0 | Status: SHIPPED | OUTPATIENT
Start: 2025-04-10

## 2025-04-21 ENCOUNTER — TELEPHONE (OUTPATIENT)
Dept: INTERNAL MEDICINE | Facility: CLINIC | Age: 60
End: 2025-04-21
Payer: MEDICAID

## 2025-04-21 NOTE — TELEPHONE ENCOUNTER
Caller: Salvador Cortez    Relationship: Self    Best call back number: [phone]     What medication are you requesting: EAR CLOGGED UP    Have you had these symptoms before:    [x] Yes  [] No    Have you been treated for these symptoms before:   [x] Yes  [] No    If a prescription is needed, what is your preferred pharmacy and phone number: [facility] [id number] [address] [phone] [facility] [phone]      Additional notes:PATIENT WAS GIVEN SAMPLES OF MEDICATION AND WANTS A CALL BACK WHEN SENT IN

## 2025-04-21 NOTE — TELEPHONE ENCOUNTER
Called patient to clarify  Patient stated that has been taking the samples for the ears being clogged but has only temporarily helped  Patient cannot remember the name of the medication

## 2025-04-22 DIAGNOSIS — H61.23 BILATERAL IMPACTED CERUMEN: Primary | ICD-10-CM

## 2025-04-22 NOTE — TELEPHONE ENCOUNTER
Spoke with patient regarding referral to ENT  Patient understanding and had no further questions at this time

## 2025-05-04 DIAGNOSIS — M79.672 BILATERAL FOOT PAIN: ICD-10-CM

## 2025-05-04 DIAGNOSIS — M79.671 BILATERAL FOOT PAIN: ICD-10-CM

## 2025-05-19 ENCOUNTER — TELEPHONE (OUTPATIENT)
Dept: INTERNAL MEDICINE | Facility: CLINIC | Age: 60
End: 2025-05-19
Payer: COMMERCIAL

## 2025-05-19 NOTE — TELEPHONE ENCOUNTER
PATIENT CALLED AND STATES THAT HE WOULD LIKE A REFERRAL PLACED FOR HIS EYES AND EARS.   PATIENT STATES THAT HE HAS ALREADY CALLED ABOUT THE REFERRAL THAT HAD BEEN PLACED AND HE IS GOING TO CALL TO RESCHEDULE THAT.    PATIENT DECLINED TO SCHEDULE AN APPT TO COME IN TO GET THE NEW REFERRALS THAT HE IS REQUESTING PLACED.    CALL BACK: [phone]

## 2025-05-19 NOTE — TELEPHONE ENCOUNTER
Called patient to clarify  Patient states has appointment with  ENT June 18th and is requesting for an order to be placed so the ENT could look at his eyes as well   Advised patient that an eye doctor is different from an ear nose throat doctor  Patient states had called over to  ENT and asked if they could look at his Right eye as well and was told that they could only do that if there is an order at the time of the visit.   Patient is not able to come in for an appointment for the crusty eye at this time but is understanding if the order or referral for the eye would need an appointment at a different doctor.

## 2025-05-20 DIAGNOSIS — H57.9 EYE PROBLEM: Primary | ICD-10-CM

## 2025-05-20 NOTE — TELEPHONE ENCOUNTER
Called [facility]   Was advised that [facility] Nose and Throat only looks at ear nose and throat but that they do advise if patients mention issues with eyes to go to [facility]

## 2025-05-20 NOTE — TELEPHONE ENCOUNTER
Spoke with patient and relayed message  Patient is understanding that the ENT will only evaluate the ear nose and throat and is requesting a referral to a eye specialist

## 2025-05-20 NOTE — TELEPHONE ENCOUNTER
Returned call and with verbal okay from provider scheduled for video visit today   Warfarin refilled to patient's preferred pharmacy per protocol.

## 2025-05-21 ENCOUNTER — TELEPHONE (OUTPATIENT)
Dept: INTERNAL MEDICINE | Facility: CLINIC | Age: 60
End: 2025-05-21

## 2025-05-21 ENCOUNTER — OFFICE VISIT (OUTPATIENT)
Dept: INTERNAL MEDICINE | Facility: CLINIC | Age: 60
End: 2025-05-21
Payer: COMMERCIAL

## 2025-05-21 VITALS
DIASTOLIC BLOOD PRESSURE: 82 MMHG | TEMPERATURE: 97.4 F | HEIGHT: 72 IN | OXYGEN SATURATION: 99 % | BODY MASS INDEX: 24.62 KG/M2 | SYSTOLIC BLOOD PRESSURE: 135 MMHG | HEART RATE: 90 BPM | WEIGHT: 181.8 LBS

## 2025-05-21 DIAGNOSIS — L29.9 ITCHING: ICD-10-CM

## 2025-05-21 DIAGNOSIS — B30.1 CONJUNCTIVITIS DUE TO ADENOVIRUS, RIGHT EYE: Primary | ICD-10-CM

## 2025-05-21 DIAGNOSIS — I10 PRIMARY HYPERTENSION: Chronic | ICD-10-CM

## 2025-05-21 PROCEDURE — 1125F AMNT PAIN NOTED PAIN PRSNT: CPT | Performed by: INTERNAL MEDICINE

## 2025-05-21 PROCEDURE — 3075F SYST BP GE 130 - 139MM HG: CPT | Performed by: INTERNAL MEDICINE

## 2025-05-21 PROCEDURE — 1160F RVW MEDS BY RX/DR IN RCRD: CPT | Performed by: INTERNAL MEDICINE

## 2025-05-21 PROCEDURE — 3044F HG A1C LEVEL LT 7.0%: CPT | Performed by: INTERNAL MEDICINE

## 2025-05-21 PROCEDURE — 3079F DIAST BP 80-89 MM HG: CPT | Performed by: INTERNAL MEDICINE

## 2025-05-21 PROCEDURE — 99213 OFFICE O/P EST LOW 20 MIN: CPT | Performed by: INTERNAL MEDICINE

## 2025-05-21 PROCEDURE — 1159F MED LIST DOCD IN RCRD: CPT | Performed by: INTERNAL MEDICINE

## 2025-05-21 RX ORDER — OFLOXACIN 3 MG/ML
1 SOLUTION/ DROPS OPHTHALMIC 4 TIMES DAILY
Qty: 10 ML | Refills: 0 | Status: SHIPPED | OUTPATIENT
Start: 2025-05-21

## 2025-05-21 RX ORDER — ERYTHROMYCIN 5 MG/G
OINTMENT OPHTHALMIC NIGHTLY
COMMUNITY

## 2025-05-21 RX ORDER — LOSARTAN POTASSIUM 50 MG/1
50 TABLET ORAL DAILY
Qty: 90 TABLET | Refills: 0 | Status: SHIPPED | OUTPATIENT
Start: 2025-05-21

## 2025-05-21 RX ORDER — ACYCLOVIR 400 MG/1
1 TABLET ORAL
Qty: 9 EACH | Refills: 1 | Status: CANCELLED | OUTPATIENT
Start: 2025-05-21

## 2025-05-21 RX ORDER — HYDROXYZINE PAMOATE 25 MG/1
25 CAPSULE ORAL 3 TIMES DAILY PRN
Qty: 60 CAPSULE | Refills: 0 | Status: SHIPPED | OUTPATIENT
Start: 2025-05-21

## 2025-05-21 RX ORDER — SILDENAFIL 50 MG/1
50 TABLET, FILM COATED ORAL DAILY PRN
Qty: 30 TABLET | Refills: 5 | Status: CANCELLED | OUTPATIENT
Start: 2025-05-21 | End: 2025-06-20

## 2025-05-21 NOTE — PROGRESS NOTES
"Subjective   Salvador Cortez is a 59 y.o. male.   Chief Complaint   Patient presents with    Eye Problem       History of Present Illness   1 week crusted eyelid in morning. Eye red. No fever. No vision loss. No eye pain.   The following portions of the patient's history were reviewed and updated as appropriate: allergies, current medications, past family history, past medical history, past social history, past surgical history, and problem list.    Review of Systems   Constitutional:  Negative for fever.   Eyes:  Positive for redness. Negative for photophobia, pain and itching.        Crusted lids   Cardiovascular:  Negative for chest pain and leg swelling.   Gastrointestinal:  Negative for abdominal pain, diarrhea and nausea.     /82 (BP Location: Left arm, Patient Position: Sitting, Cuff Size: Adult)   Pulse 90   Temp 97.4 °F (36.3 °C) (Temporal)   Ht 183 cm (72.05\")   Wt 82.5 kg (181 lb 12.8 oz)   SpO2 99%   BMI 24.62 kg/m²     Objective   Physical Exam  Vitals reviewed.   Eyes:      General: No scleral icterus.     Comments: Mild redness right eye,    Cardiovascular:      Rate and Rhythm: Normal rate and regular rhythm.   Pulmonary:      Effort: No respiratory distress.      Breath sounds: No wheezing.   Neurological:      Mental Status: He is alert and oriented to person, place, and time.         Assessment & Plan   Diagnoses and all orders for this visit:    1. Conjunctivitis due to adenovirus, right eye (Primary)  -     ofloxacin (Ocuflox) 0.3 % ophthalmic solution; Administer 1 drop to both eyes 4 (Four) Times a Day.  Dispense: 10 mL; Refill: 0    2. Primary hypertension  -     losartan (COZAAR) 50 MG tablet; Take 1 tablet by mouth Daily.  Dispense: 90 tablet; Refill: 0    3. Itching  -     hydrOXYzine pamoate (VISTARIL) 25 MG capsule; Take 1 capsule by mouth 3 (Three) Times a Day As Needed for Itching (TAKE 1 CAPSULE BY MOUTH 3 TIMES A DAY AS NEEDED FOR ITCHING Strength: 25 MG).  Dispense: 60 " capsule; Refill: 0      He reports that he had colonoscopy about 6 m ago.  We are requesting copy.

## 2025-05-21 NOTE — TELEPHONE ENCOUNTER
Per provider called [facility] and requested records associated with colonoscopy with Dr Leny Bethea  [phone]  Patient cannot be found in system with Dr Bethea  The nurse was able to find that the patient last ws associated with Dr Lewis June 2024 when had done a endoscopy and biopsy of stomach and colon

## 2025-06-04 ENCOUNTER — TELEPHONE (OUTPATIENT)
Dept: ENDOCRINOLOGY | Facility: CLINIC | Age: 60
End: 2025-06-04
Payer: COMMERCIAL

## 2025-06-04 ENCOUNTER — TELEPHONE (OUTPATIENT)
Dept: INTERNAL MEDICINE | Facility: CLINIC | Age: 60
End: 2025-06-04
Payer: COMMERCIAL

## 2025-06-04 RX ORDER — SILDENAFIL 100 MG/1
100 TABLET, FILM COATED ORAL DAILY PRN
Qty: 9 TABLET | Refills: 3 | Status: SHIPPED | OUTPATIENT
Start: 2025-06-04

## 2025-06-04 RX ORDER — HYDROCHLOROTHIAZIDE 12.5 MG/1
CAPSULE ORAL
Qty: 6 EACH | Refills: 3 | Status: SHIPPED | OUTPATIENT
Start: 2025-06-04

## 2025-06-04 NOTE — TELEPHONE ENCOUNTER
Spoke with patient and let him know that we could change the script to freestyle jermaine 3 he verbalized understanding and would like that called in. He will also call local pharmacies to see if they might have the dexcom in stock.     He also stated at his last visit it was discuss that the Viagra prescription would be increased to 100 mg. Please send both to [facility] pharmacy on file.

## 2025-06-04 NOTE — TELEPHONE ENCOUNTER
PATIENT CALLED AND STATED HE CANNOT FIND A   Continuous Glucose Sensor (Dexcom G7 Sensor)  FOR HIS ARM ANYWHERE.  HE'S CHECKED EVERY PHARMACY.  HE'D LIKE TO KNOW IF THERE ARE OTHER OPTIONS.    PLEASE RETURN CALL: [phone]

## 2025-06-04 NOTE — TELEPHONE ENCOUNTER
Pt called stated he has called around to different pharmacies for Dexcom g7 sensor all are out of stock. Pt wanting a prescription for Free style jermaine 3 sensor. Sent to [facility] pharmacy [address]

## 2025-06-16 ENCOUNTER — TELEPHONE (OUTPATIENT)
Dept: INTERNAL MEDICINE | Facility: CLINIC | Age: 60
End: 2025-06-16
Payer: COMMERCIAL

## 2025-06-16 RX ORDER — SILDENAFIL 100 MG/1
100 TABLET, FILM COATED ORAL DAILY PRN
Qty: 30 TABLET | Refills: 0 | Status: SHIPPED | OUTPATIENT
Start: 2025-06-16

## 2025-06-16 NOTE — TELEPHONE ENCOUNTER
Caller: Salvador Cortez    Relationship: Self    Best call back number: [phone]     Which medication are you concerned about: VIAGRA    Who prescribed you this medication: TY    When did you start taking this medication:     What are your concerns: ONLY 9 PILLS HE SAID IS NOT ENOUGH. PLEASE CALL PT    How long have you had these concerns:

## 2025-06-16 NOTE — TELEPHONE ENCOUNTER
Returned call to patient and relayed message  Patient quite appreciative and understands this medication is not daily

## 2025-06-30 DIAGNOSIS — L29.9 ITCHING: ICD-10-CM

## 2025-06-30 RX ORDER — HYDROXYZINE PAMOATE 25 MG/1
25 CAPSULE ORAL 3 TIMES DAILY PRN
Qty: 60 CAPSULE | Refills: 0 | Status: SHIPPED | OUTPATIENT
Start: 2025-06-30

## 2025-07-03 DIAGNOSIS — Z79.4 DIABETES MELLITUS TREATED WITH INSULIN: ICD-10-CM

## 2025-07-03 DIAGNOSIS — E11.9 DIABETES MELLITUS TREATED WITH INSULIN: ICD-10-CM

## 2025-07-03 RX ORDER — BLOOD SUGAR DIAGNOSTIC
1 STRIP MISCELLANEOUS DAILY
Qty: 100 EACH | Refills: 3 | Status: SHIPPED | OUTPATIENT
Start: 2025-07-03

## 2025-07-03 RX ORDER — INSULIN GLARGINE 100 [IU]/ML
30 INJECTION, SOLUTION SUBCUTANEOUS DAILY
Qty: 9 ML | Refills: 11 | Status: CANCELLED | OUTPATIENT
Start: 2025-07-03 | End: 2026-07-03

## 2025-07-03 NOTE — TELEPHONE ENCOUNTER
Patient called office, stated that his pharmacy cannot fill his lantus. Said he is not sure what the problem is. I asked the patient if he needed a PA he said he was not sure and that we needed to call pharmacy cause he does not understand. He is down to his last pen.

## 2025-07-03 NOTE — TELEPHONE ENCOUNTER
Rx Refill Note  Requested Prescriptions     Pending Prescriptions Disp Refills    Insulin Pen Needle (Pen Needles) 32G X 6 MM misc 100 each 3     Sig: Use 1 each Daily.      Last office visit with prescribing clinician: 3/7/2025     Next office visit with prescribing clinician: 7/14/2025

## 2025-07-09 RX ORDER — OFLOXACIN 3 MG/ML
1 SOLUTION/ DROPS OPHTHALMIC 4 TIMES DAILY
Qty: 10 ML | Refills: 0 | Status: CANCELLED | OUTPATIENT
Start: 2025-07-09

## 2025-07-09 RX ORDER — HYDROXYZINE PAMOATE 25 MG/1
25 CAPSULE ORAL 3 TIMES DAILY PRN
Qty: 60 CAPSULE | Refills: 0 | Status: CANCELLED | OUTPATIENT
Start: 2025-07-09

## 2025-07-14 ENCOUNTER — OFFICE VISIT (OUTPATIENT)
Dept: ENDOCRINOLOGY | Facility: CLINIC | Age: 60
End: 2025-07-14
Payer: COMMERCIAL

## 2025-07-14 ENCOUNTER — DOCUMENTATION (OUTPATIENT)
Dept: ENDOCRINOLOGY | Facility: CLINIC | Age: 60
End: 2025-07-14

## 2025-07-14 VITALS
HEIGHT: 72 IN | HEART RATE: 70 BPM | SYSTOLIC BLOOD PRESSURE: 102 MMHG | BODY MASS INDEX: 24.79 KG/M2 | OXYGEN SATURATION: 97 % | DIASTOLIC BLOOD PRESSURE: 52 MMHG | WEIGHT: 183 LBS

## 2025-07-14 DIAGNOSIS — E11.65 TYPE 2 DIABETES MELLITUS WITH HYPERGLYCEMIA, WITH LONG-TERM CURRENT USE OF INSULIN: Primary | ICD-10-CM

## 2025-07-14 DIAGNOSIS — Z79.4 TYPE 2 DIABETES MELLITUS WITH HYPERGLYCEMIA, WITH LONG-TERM CURRENT USE OF INSULIN: Primary | ICD-10-CM

## 2025-07-14 LAB
ALBUMIN SERPL-MCNC: 4 G/DL (ref 3.5–5.2)
ALBUMIN/GLOB SERPL: 1.1 G/DL
ALP SERPL-CCNC: 104 U/L (ref 39–117)
ALT SERPL W P-5'-P-CCNC: 13 U/L (ref 1–41)
ANION GAP SERPL CALCULATED.3IONS-SCNC: 13.8 MMOL/L (ref 5–15)
AST SERPL-CCNC: 15 U/L (ref 1–40)
BILIRUB SERPL-MCNC: 0.6 MG/DL (ref 0–1.2)
BUN SERPL-MCNC: 20 MG/DL (ref 6–20)
BUN/CREAT SERPL: 10.1 (ref 7–25)
CALCIUM SPEC-SCNC: 9.9 MG/DL (ref 8.6–10.5)
CHLORIDE SERPL-SCNC: 100 MMOL/L (ref 98–107)
CO2 SERPL-SCNC: 23.2 MMOL/L (ref 22–29)
CREAT SERPL-MCNC: 1.98 MG/DL (ref 0.76–1.27)
EGFRCR SERPLBLD CKD-EPI 2021: 38.2 ML/MIN/1.73
EXPIRATION DATE: ABNORMAL
EXPIRATION DATE: ABNORMAL
GLOBULIN UR ELPH-MCNC: 3.8 GM/DL
GLUCOSE BLDC GLUCOMTR-MCNC: 367 MG/DL (ref 70–130)
GLUCOSE SERPL-MCNC: 290 MG/DL (ref 65–99)
HBA1C MFR BLD: 7.6 % (ref 4.5–5.7)
Lab: ABNORMAL
Lab: ABNORMAL
POTASSIUM SERPL-SCNC: 4.1 MMOL/L (ref 3.5–5.2)
PROT SERPL-MCNC: 7.8 G/DL (ref 6–8.5)
SODIUM SERPL-SCNC: 137 MMOL/L (ref 136–145)

## 2025-07-14 PROCEDURE — 80053 COMPREHEN METABOLIC PANEL: CPT | Performed by: PHYSICIAN ASSISTANT

## 2025-07-14 NOTE — ASSESSMENT & PLAN NOTE
Diabetes is worsening.   Continue current treatment regimen.  Recommended an ADA diet.  Regular aerobic exercise.  Reminded to get yearly retinal exam.    Patient met with diabetes educator to help with set up of jermaine 3+ CGM sensor.  Suspect that when patient is back to monitoring glucose levels more consistently, his A1c will improve.  Did not make any changes to medication today, he will continue Jardiance 25 mg daily and Lantus 28 units daily.    Reminded patient to schedule eye exam, foot exam up-to-date (will be due at next visit), fasting labs and urine microalbumin creatinine ratio up-to-date.    Diabetes will be reassessed in 3 months

## 2025-07-14 NOTE — PROGRESS NOTES
Office Note      Date: 2025  Patient Name: Salvador Cortez  MRN: [id number]  : 1965    Chief Complaint   Patient presents with    Diabetes     Type II Diabetes Mellitus with Diabetic Nephropathy, with Long-Term Current Use of Insulin       History of Present Illness:   Salvador Cortez is a 59 y.o. male who presents for Diabetes type 2.   Diagnosed: 2019  Current RX:  Lantus 28 units, Jardiance 25 mg daily      Bg checks are done: rarely for the past 2 months  Hypoglycemia :occasionally    Patient has not been wearing a sensor for the past 2 months because the Dexcom was on back order. He was switched to jermaine 3 plus but was unable to set this up on his own.    He has continued taking his medications but not watching his diet as carefully.    Last A1c:  Hemoglobin A1C   Date Value Ref Range Status   2025 7.6 (A) 4.5 - 5.7 % Final       Changes in health since last visit: none.     DM Health Maintenance:  Ophtho: 2024  Monofilament / Foot exam: 24   Lipids/Statin: not taking a statin with last FLP showing  25  KARIE: 24 normal  TSH: 0.658 24  Aspirin: not taking  ACE/ARB: losartan     Diabetic Complications:  Eyes: No  Kidneys: No  Feet: No  Heart: No    Subjective          Review of Systems:   Review of Systems   Constitutional:  Negative for activity change, appetite change and fatigue.   Respiratory:  Negative for chest tightness and shortness of breath.    Gastrointestinal:  Negative for abdominal pain.   Musculoskeletal:  Negative for myalgias.   Neurological:  Negative for numbness.   Psychiatric/Behavioral:  The patient is not nervous/anxious.        The following portions of the patient's history were reviewed and updated as appropriate: allergies, current medications, past family history, past medical history, past social history, past surgical history, and problem list.    Objective     Visit Vitals  /52 (BP Location: Left arm, Patient Position:  "Sitting, Cuff Size: Adult)   Pulse 70   Ht 183 cm (72.05\")   Wt 83 kg (183 lb)   SpO2 97%   BMI 24.79 kg/m²           Physical Exam:  Physical Exam  Constitutional:       Appearance: He is well-developed.   HENT:      Head: Normocephalic and atraumatic.      Right Ear: External ear normal.      Left Ear: External ear normal.   Eyes:      Conjunctiva/sclera: Conjunctivae normal.   Pulmonary:      Effort: Pulmonary effort is normal.   Musculoskeletal:         General: Normal range of motion.      Cervical back: Normal range of motion.   Skin:     General: Skin is warm and dry.   Psychiatric:         Behavior: Behavior normal.          Assessment / Plan      Assessment & Plan:  Diagnoses and all orders for this visit:    1. Type 2 diabetes mellitus with hyperglycemia, with long-term current use of insulin (Primary)  Assessment & Plan:  Diabetes is worsening.   Continue current treatment regimen.  Recommended an ADA diet.  Regular aerobic exercise.  Reminded to get yearly retinal exam.    Patient met with diabetes educator to help with set up of Skyfiber 3+ CGM sensor.  Suspect that when patient is back to monitoring glucose levels more consistently, his A1c will improve.  Did not make any changes to medication today, he will continue Jardiance 25 mg daily and Lantus 28 units daily.    Reminded patient to schedule eye exam, foot exam up-to-date (will be due at next visit), fasting labs and urine microalbumin creatinine ratio up-to-date.    Diabetes will be reassessed in 3 months    Orders:  -     POC Glucose, Blood  -     POC Glycosylated Hemoglobin (Hb A1C)  -     Comprehensive Metabolic Panel; Future  -     Comprehensive Metabolic Panel        Return in about 3 months (around 10/14/2025) for Follow up.    Portions of this note were completed with voice recognition program.  Electronically signed by Meenu Moore PA-C  [facility]  07/14/2025  "

## 2025-07-17 ENCOUNTER — LAB (OUTPATIENT)
Dept: LAB | Facility: HOSPITAL | Age: 60
End: 2025-07-17
Payer: COMMERCIAL

## 2025-07-17 ENCOUNTER — OFFICE VISIT (OUTPATIENT)
Dept: INTERNAL MEDICINE | Facility: CLINIC | Age: 60
End: 2025-07-17
Payer: COMMERCIAL

## 2025-07-17 VITALS
TEMPERATURE: 97.9 F | OXYGEN SATURATION: 99 % | DIASTOLIC BLOOD PRESSURE: 68 MMHG | WEIGHT: 182.8 LBS | SYSTOLIC BLOOD PRESSURE: 120 MMHG | HEIGHT: 72 IN | HEART RATE: 62 BPM | BODY MASS INDEX: 24.76 KG/M2

## 2025-07-17 DIAGNOSIS — E78.5 HYPERLIPIDEMIA LDL GOAL <100: ICD-10-CM

## 2025-07-17 DIAGNOSIS — M79.671 BILATERAL FOOT PAIN: ICD-10-CM

## 2025-07-17 DIAGNOSIS — I10 PRIMARY HYPERTENSION: Chronic | ICD-10-CM

## 2025-07-17 DIAGNOSIS — M79.672 BILATERAL FOOT PAIN: ICD-10-CM

## 2025-07-17 DIAGNOSIS — S39.012A STRAIN OF LUMBAR REGION, INITIAL ENCOUNTER: ICD-10-CM

## 2025-07-17 LAB
ALBUMIN SERPL-MCNC: 4.4 G/DL (ref 3.5–5.2)
ALBUMIN/GLOB SERPL: 1.3 G/DL
ALP SERPL-CCNC: 86 U/L (ref 39–117)
ALT SERPL W P-5'-P-CCNC: 13 U/L (ref 1–41)
ANION GAP SERPL CALCULATED.3IONS-SCNC: 9.5 MMOL/L (ref 5–15)
AST SERPL-CCNC: 16 U/L (ref 1–40)
BILIRUB SERPL-MCNC: 0.7 MG/DL (ref 0–1.2)
BUN SERPL-MCNC: 29 MG/DL (ref 6–20)
BUN/CREAT SERPL: 17.6 (ref 7–25)
CALCIUM SPEC-SCNC: 10.1 MG/DL (ref 8.6–10.5)
CHLORIDE SERPL-SCNC: 105 MMOL/L (ref 98–107)
CHOLEST SERPL-MCNC: 215 MG/DL (ref 0–200)
CO2 SERPL-SCNC: 25.5 MMOL/L (ref 22–29)
CREAT SERPL-MCNC: 1.65 MG/DL (ref 0.76–1.27)
EGFRCR SERPLBLD CKD-EPI 2021: 47.5 ML/MIN/1.73
GLOBULIN UR ELPH-MCNC: 3.4 GM/DL
GLUCOSE SERPL-MCNC: 153 MG/DL (ref 65–99)
HDLC SERPL-MCNC: 42 MG/DL (ref 40–60)
LDLC SERPL CALC-MCNC: 154 MG/DL (ref 0–100)
LDLC/HDLC SERPL: 3.61 {RATIO}
POTASSIUM SERPL-SCNC: 5 MMOL/L (ref 3.5–5.2)
PROT SERPL-MCNC: 7.8 G/DL (ref 6–8.5)
SODIUM SERPL-SCNC: 140 MMOL/L (ref 136–145)
TRIGL SERPL-MCNC: 106 MG/DL (ref 0–150)
VLDLC SERPL-MCNC: 19 MG/DL (ref 5–40)

## 2025-07-17 PROCEDURE — 80053 COMPREHEN METABOLIC PANEL: CPT

## 2025-07-17 PROCEDURE — 80061 LIPID PANEL: CPT

## 2025-07-17 RX ORDER — CYCLOBENZAPRINE HCL 5 MG
TABLET ORAL
Qty: 14 TABLET | Refills: 0 | Status: SHIPPED | OUTPATIENT
Start: 2025-07-17

## 2025-07-17 RX ORDER — SILDENAFIL 100 MG/1
100 TABLET, FILM COATED ORAL DAILY PRN
Qty: 30 TABLET | Refills: 0 | Status: SHIPPED | OUTPATIENT
Start: 2025-07-17

## 2025-07-17 RX ORDER — LOSARTAN POTASSIUM 50 MG/1
50 TABLET ORAL DAILY
Qty: 90 TABLET | Refills: 0 | Status: SHIPPED | OUTPATIENT
Start: 2025-07-17

## 2025-07-17 NOTE — PROGRESS NOTES
"Subjective   Salvador Cortez is a 59 y.o. male.   Chief Complaint   Patient presents with    Back Pain     Since sunday    Med Management       History of Present Illness   HTN, HLP, and foot pain f/u. HTN doing better with the increase in losartan. HLP and not on a statin. Needs new lipid labs. Foot pain better with gel. No CP or SOA.     Low back pain after taking a motorcycle class. Feels tight.   The following portions of the patient's history were reviewed and updated as appropriate: allergies, current medications, past family history, past medical history, past social history, past surgical history, and problem list.    Review of Systems   Constitutional:  Negative for fatigue and fever.   Respiratory:  Negative for cough and shortness of breath.    Cardiovascular:  Negative for chest pain and leg swelling.   Gastrointestinal:  Negative for abdominal pain.   Musculoskeletal:  Positive for back pain.   Psychiatric/Behavioral:  Negative for confusion.      /68 (BP Location: Left arm, Patient Position: Sitting, Cuff Size: Adult)   Pulse 62   Temp 97.9 °F (36.6 °C) (Temporal)   Ht 183 cm (72.05\")   Wt 82.9 kg (182 lb 12.8 oz)   SpO2 99%   BMI 24.76 kg/m²     Objective   Physical Exam  Vitals reviewed.   Cardiovascular:      Rate and Rhythm: Normal rate and regular rhythm.   Pulmonary:      Effort: No respiratory distress.      Breath sounds: No wheezing.   Neurological:      Mental Status: He is alert.         Assessment & Plan   Diagnoses and all orders for this visit:    1. Primary hypertension  -     losartan (COZAAR) 50 MG tablet; Take 1 tablet by mouth Daily.  Dispense: 90 tablet; Refill: 0  -     Comprehensive Metabolic Panel; Future  Continue losartan  2. Bilateral foot pain  -     Diclofenac Sodium (VOLTAREN) 1 % gel gel; Apply  topically to the appropriate area as directed 4 (Four) Times a Day As Needed (APPLY 4 GRAMS TOPICALLY 4 TIMES A DAY AS NEEDED FOR FOOT PAIN Strength: 1 %).  Dispense: " 100 g; Refill: 0    3. Hyperlipidemia LDL goal <100  -     Lipid Panel; Future    4. Strain of lumbar region, initial encounter  -     cyclobenzaprine (FLEXERIL) 5 MG tablet; One po qhs prn pain  Dispense: 14 tablet; Refill: 0  Start prn flexeril. Offered PT but he declined.   Other orders  -     sildenafil (Viagra) 100 MG tablet; Take 1 tablet by mouth Daily As Needed for Erectile Dysfunction (Take 1 tablet by mouth Daily As Needed for Erectile Dysfunction. Strength: 100 MG).  Dispense: 30 tablet; Refill: 0

## 2025-07-21 ENCOUNTER — RESULTS FOLLOW-UP (OUTPATIENT)
Dept: INTERNAL MEDICINE | Facility: CLINIC | Age: 60
End: 2025-07-21
Payer: COMMERCIAL

## 2025-07-21 ENCOUNTER — TELEPHONE (OUTPATIENT)
Dept: INTERNAL MEDICINE | Facility: CLINIC | Age: 60
End: 2025-07-21
Payer: COMMERCIAL

## 2025-07-21 NOTE — TELEPHONE ENCOUNTER
Called and spoke to pt. Gave message from provider as to kidney function and cholesterol. Pt voiced understanding and appreciation.

## 2025-07-21 NOTE — TELEPHONE ENCOUNTER
Called spouse (in  [place]) and relayed results as requested, Spouse appreciative and was looking for guidelines on low cholesterol diet. Relayed information. No further questions verbalized   Josefina Yeung, DO 7/21/25  8:42 AM  Result Note  Your kidney function is decreased.  Avoid any products that contain ibuprofen.  But back to his baseline.  Cholesterol high. Low cholesterol diet. Continue to work with Endo to help lower his blood sugars.   Comprehensive Metabolic Panel; Lipid Panel

## 2025-07-21 NOTE — TELEPHONE ENCOUNTER
Caller: Salvador Cortez    Relationship: Self    Best call back number: [phone]     What is the best time to reach you: ANYTIME     Who are you requesting to speak with (clinical staff, provider,  specific staff member): CLINICAL STAFF    What was the call regarding: PATIENT IS CALLING TO SEE IF THE OFFICE CAN CALL HIS WIFE AND GIVE HER THE TEST RESULTS THAT HE WAS TOLD. HE SAYS THAT HE TRIED TO RELAY THEM TO HER BUT GOT THEM WRONG AND SHE HAS QUESTIONS THAT HE IS TO SURE HOW TO ANSWER. THE NUMBER -[phone]    Is it okay if the provider responds through [employer]hart: NO

## 2025-07-22 ENCOUNTER — TELEPHONE (OUTPATIENT)
Dept: INTERNAL MEDICINE | Facility: CLINIC | Age: 60
End: 2025-07-22
Payer: COMMERCIAL

## 2025-07-22 NOTE — TELEPHONE ENCOUNTER
Called and left VM with pt to reach out to Endocrinology to assist with BS and device. Number for Endocrinology provided.

## 2025-07-22 NOTE — TELEPHONE ENCOUNTER
Pt called and stated that his Freestyle Wilbert   Meter kept going off all night and all day   At present meter was reading 237   Pt stated he had eaten 2 sausage, hash brown and wheat bread.   Has 7 more days left.   Requesting specific information addressing his sensor, thinking it was broken.   Gave number for Customer support for Freestyle Wilbert [phone] if he had concerns on device being broken    Please advise on elevated sugar level.

## 2025-07-23 NOTE — TELEPHONE ENCOUNTER
Returned call to patient   Patient was very frustrated with the device and was wanting the name of the endocrinology provider  Patient stated had the number for Meenu Moore and appreciated the return call

## 2025-07-23 NOTE — TELEPHONE ENCOUNTER
PATIENT RETURNED CALL TO THE OFFICE.    THE MESSAGE WAS READ VERBATIM  TO THE PATIENT. I OFFERED THE PATIENT THE NUMBER FOR ENDO AND THE PATIENT DECLINED.    PATIENT STATES THAT HE WOULD LIKE TO SPEAK WITH SOMEONE IN OUR OFFICE.    PATIENT STATES THAT ITS OKAY TO LEAVE A VOICEMAIL.      CALL BACK: [phone]

## 2025-08-21 ENCOUNTER — TELEPHONE (OUTPATIENT)
Dept: INTERNAL MEDICINE | Facility: CLINIC | Age: 60
End: 2025-08-21
Payer: COMMERCIAL

## 2025-08-21 ENCOUNTER — TELEPHONE (OUTPATIENT)
Dept: ENDOCRINOLOGY | Facility: CLINIC | Age: 60
End: 2025-08-21
Payer: COMMERCIAL